# Patient Record
Sex: MALE | Race: WHITE | Employment: OTHER | URBAN - METROPOLITAN AREA
[De-identification: names, ages, dates, MRNs, and addresses within clinical notes are randomized per-mention and may not be internally consistent; named-entity substitution may affect disease eponyms.]

---

## 2018-11-09 ENCOUNTER — APPOINTMENT (OUTPATIENT)
Dept: GENERAL RADIOLOGY | Age: 58
End: 2018-11-09
Attending: EMERGENCY MEDICINE
Payer: SELF-PAY

## 2018-11-09 ENCOUNTER — HOSPITAL ENCOUNTER (OUTPATIENT)
Age: 58
Setting detail: OBSERVATION
Discharge: HOME | End: 2018-11-12
Attending: EMERGENCY MEDICINE | Admitting: INTERNAL MEDICINE
Payer: SELF-PAY

## 2018-11-09 DIAGNOSIS — R07.9 CHEST PAIN: ICD-10-CM

## 2018-11-09 DIAGNOSIS — R07.9 CHEST PAIN, UNSPECIFIED TYPE: Primary | ICD-10-CM

## 2018-11-09 LAB
ALBUMIN SERPL-MCNC: 3.8 G/DL (ref 3.5–5)
ALBUMIN/GLOB SERPL: 1.1 {RATIO} (ref 1.2–3.5)
ALP SERPL-CCNC: 123 U/L (ref 50–136)
ALT SERPL-CCNC: 27 U/L (ref 12–65)
ANION GAP SERPL CALC-SCNC: 9 MMOL/L (ref 7–16)
AST SERPL-CCNC: 15 U/L (ref 15–37)
BASOPHILS # BLD: 0 K/UL (ref 0–0.2)
BASOPHILS NFR BLD: 1 % (ref 0–2)
BILIRUB SERPL-MCNC: 0.2 MG/DL (ref 0.2–1.1)
BUN SERPL-MCNC: 15 MG/DL (ref 6–23)
CALCIUM SERPL-MCNC: 8.5 MG/DL (ref 8.3–10.4)
CHLORIDE SERPL-SCNC: 109 MMOL/L (ref 98–107)
CO2 SERPL-SCNC: 22 MMOL/L (ref 21–32)
CREAT SERPL-MCNC: 1.16 MG/DL (ref 0.8–1.5)
DIFFERENTIAL METHOD BLD: ABNORMAL
EOSINOPHIL # BLD: 0.1 K/UL (ref 0–0.8)
EOSINOPHIL NFR BLD: 3 % (ref 0.5–7.8)
ERYTHROCYTE [DISTWIDTH] IN BLOOD BY AUTOMATED COUNT: 19.3 %
GLOBULIN SER CALC-MCNC: 3.5 G/DL (ref 2.3–3.5)
GLUCOSE SERPL-MCNC: 82 MG/DL (ref 65–100)
HCT VFR BLD AUTO: 34.3 % (ref 41.1–50.3)
HGB BLD-MCNC: 10.3 G/DL (ref 13.6–17.2)
IMM GRANULOCYTES # BLD: 0.1 K/UL (ref 0–0.5)
IMM GRANULOCYTES NFR BLD AUTO: 2 % (ref 0–5)
LYMPHOCYTES # BLD: 0.9 K/UL (ref 0.5–4.6)
LYMPHOCYTES NFR BLD: 26 % (ref 13–44)
MCH RBC QN AUTO: 24.6 PG (ref 26.1–32.9)
MCHC RBC AUTO-ENTMCNC: 30 G/DL (ref 31.4–35)
MCV RBC AUTO: 82.1 FL (ref 79.6–97.8)
MONOCYTES # BLD: 0.5 K/UL (ref 0.1–1.3)
MONOCYTES NFR BLD: 14 % (ref 4–12)
NEUTS SEG # BLD: 1.9 K/UL (ref 1.7–8.2)
NEUTS SEG NFR BLD: 54 % (ref 43–78)
NRBC # BLD: 0.02 K/UL (ref 0–0.2)
PLATELET # BLD AUTO: 230 K/UL (ref 150–450)
PMV BLD AUTO: 10.6 FL (ref 9.4–12.3)
POTASSIUM SERPL-SCNC: 4.2 MMOL/L (ref 3.5–5.1)
PROT SERPL-MCNC: 7.3 G/DL (ref 6.3–8.2)
RBC # BLD AUTO: 4.18 M/UL (ref 4.23–5.6)
SODIUM SERPL-SCNC: 140 MMOL/L (ref 136–145)
TROPONIN I BLD-MCNC: 0.01 NG/ML (ref 0.02–0.05)
WBC # BLD AUTO: 3.4 K/UL (ref 4.3–11.1)

## 2018-11-09 PROCEDURE — 93005 ELECTROCARDIOGRAM TRACING: CPT | Performed by: EMERGENCY MEDICINE

## 2018-11-09 PROCEDURE — 80053 COMPREHEN METABOLIC PANEL: CPT

## 2018-11-09 PROCEDURE — 84484 ASSAY OF TROPONIN QUANT: CPT

## 2018-11-09 PROCEDURE — 99285 EMERGENCY DEPT VISIT HI MDM: CPT | Performed by: EMERGENCY MEDICINE

## 2018-11-09 PROCEDURE — 71046 X-RAY EXAM CHEST 2 VIEWS: CPT

## 2018-11-09 PROCEDURE — 85025 COMPLETE CBC W/AUTO DIFF WBC: CPT

## 2018-11-09 RX ORDER — CLOPIDOGREL BISULFATE 75 MG/1
75 TABLET ORAL
COMMUNITY

## 2018-11-09 RX ORDER — LAMOTRIGINE 100 MG/1
100 TABLET ORAL 2 TIMES DAILY
COMMUNITY

## 2018-11-09 RX ORDER — LEVETIRACETAM 1000 MG/1
1000 TABLET ORAL 2 TIMES DAILY
COMMUNITY

## 2018-11-09 RX ORDER — ONDANSETRON 2 MG/ML
4 INJECTION INTRAMUSCULAR; INTRAVENOUS
Status: DISCONTINUED | OUTPATIENT
Start: 2018-11-09 | End: 2018-11-10

## 2018-11-09 RX ORDER — ENOXAPARIN SODIUM 100 MG/ML
80 INJECTION SUBCUTANEOUS
COMMUNITY

## 2018-11-09 RX ORDER — LOSARTAN POTASSIUM 100 MG/1
100 TABLET ORAL DAILY
COMMUNITY

## 2018-11-09 RX ORDER — WARFARIN 10 MG/1
10 TABLET ORAL DAILY
COMMUNITY

## 2018-11-09 RX ORDER — METOPROLOL SUCCINATE 25 MG/1
25 TABLET, EXTENDED RELEASE ORAL DAILY
COMMUNITY

## 2018-11-09 RX ORDER — TEMAZEPAM 30 MG/1
CAPSULE ORAL
COMMUNITY

## 2018-11-09 RX ORDER — SERTRALINE HYDROCHLORIDE 100 MG/1
100 TABLET, FILM COATED ORAL DAILY
COMMUNITY

## 2018-11-09 RX ORDER — ATORVASTATIN CALCIUM 20 MG/1
20 TABLET, FILM COATED ORAL DAILY
COMMUNITY

## 2018-11-10 ENCOUNTER — APPOINTMENT (OUTPATIENT)
Dept: ULTRASOUND IMAGING | Age: 58
End: 2018-11-10
Attending: NURSE PRACTITIONER
Payer: SELF-PAY

## 2018-11-10 PROBLEM — R56.9 SEIZURE (HCC): Chronic | Status: ACTIVE | Noted: 2018-11-10

## 2018-11-10 PROBLEM — S06.9XAA TBI (TRAUMATIC BRAIN INJURY): Chronic | Status: ACTIVE | Noted: 2018-11-10

## 2018-11-10 PROBLEM — Z86.73 H/O: CVA (CEREBROVASCULAR ACCIDENT): Chronic | Status: ACTIVE | Noted: 2018-11-10

## 2018-11-10 PROBLEM — Z86.711 HX PULMONARY EMBOLISM: Chronic | Status: ACTIVE | Noted: 2018-11-10

## 2018-11-10 PROBLEM — I25.10 CAD (CORONARY ARTERY DISEASE): Status: ACTIVE | Noted: 2018-11-10

## 2018-11-10 PROBLEM — I10 HTN (HYPERTENSION): Chronic | Status: ACTIVE | Noted: 2018-11-10

## 2018-11-10 PROBLEM — R07.9 CHEST PAIN: Status: ACTIVE | Noted: 2018-11-10

## 2018-11-10 PROBLEM — Z86.718 HISTORY OF DVT (DEEP VEIN THROMBOSIS): Chronic | Status: ACTIVE | Noted: 2018-11-10

## 2018-11-10 PROBLEM — I50.22 CHRONIC SYSTOLIC CHF (CONGESTIVE HEART FAILURE) (HCC): Chronic | Status: ACTIVE | Noted: 2018-11-10

## 2018-11-10 PROBLEM — I25.10 CAD (CORONARY ARTERY DISEASE): Chronic | Status: ACTIVE | Noted: 2018-11-10

## 2018-11-10 PROBLEM — E78.5 HLD (HYPERLIPIDEMIA): Chronic | Status: ACTIVE | Noted: 2018-11-10

## 2018-11-10 PROBLEM — F43.10 PTSD (POST-TRAUMATIC STRESS DISORDER): Chronic | Status: ACTIVE | Noted: 2018-11-10

## 2018-11-10 LAB
AMPHET UR QL SCN: NEGATIVE
ATRIAL RATE: 68 BPM
ATRIAL RATE: 97 BPM
BARBITURATES UR QL SCN: NEGATIVE
BENZODIAZ UR QL: NEGATIVE
CALCULATED P AXIS, ECG09: 18 DEGREES
CALCULATED P AXIS, ECG09: 32 DEGREES
CALCULATED R AXIS, ECG10: 24 DEGREES
CALCULATED R AXIS, ECG10: 24 DEGREES
CALCULATED T AXIS, ECG11: 25 DEGREES
CALCULATED T AXIS, ECG11: 4 DEGREES
CANNABINOIDS UR QL SCN: NEGATIVE
CHOLEST SERPL-MCNC: 127 MG/DL
COCAINE UR QL SCN: NEGATIVE
D DIMER PPP FEU-MCNC: 1.7 UG/ML(FEU)
DIAGNOSIS, 93000: NORMAL
DIAGNOSIS, 93000: NORMAL
HDLC SERPL-MCNC: 41 MG/DL (ref 40–60)
HDLC SERPL: 3.1 {RATIO}
INR PPP: 1
LDLC SERPL CALC-MCNC: 33.4 MG/DL
LIPID PROFILE,FLP: ABNORMAL
METHADONE UR QL: NEGATIVE
OPIATES UR QL: POSITIVE
P-R INTERVAL, ECG05: 144 MS
P-R INTERVAL, ECG05: 146 MS
PCP UR QL: NEGATIVE
PROTHROMBIN TIME: 13.1 SEC (ref 11.5–14.5)
Q-T INTERVAL, ECG07: 346 MS
Q-T INTERVAL, ECG07: 416 MS
QRS DURATION, ECG06: 80 MS
QRS DURATION, ECG06: 86 MS
QTC CALCULATION (BEZET), ECG08: 439 MS
QTC CALCULATION (BEZET), ECG08: 442 MS
TRIGL SERPL-MCNC: 263 MG/DL (ref 35–150)
TROPONIN I SERPL-MCNC: <0.02 NG/ML (ref 0.02–0.05)
VENTRICULAR RATE, ECG03: 68 BPM
VENTRICULAR RATE, ECG03: 97 BPM
VLDLC SERPL CALC-MCNC: 52.6 MG/DL (ref 6–23)

## 2018-11-10 PROCEDURE — 93005 ELECTROCARDIOGRAM TRACING: CPT | Performed by: NURSE PRACTITIONER

## 2018-11-10 PROCEDURE — 36592 COLLECT BLOOD FROM PICC: CPT

## 2018-11-10 PROCEDURE — 96375 TX/PRO/DX INJ NEW DRUG ADDON: CPT

## 2018-11-10 PROCEDURE — 80061 LIPID PANEL: CPT

## 2018-11-10 PROCEDURE — 99218 HC RM OBSERVATION: CPT

## 2018-11-10 PROCEDURE — 85379 FIBRIN DEGRADATION QUANT: CPT

## 2018-11-10 PROCEDURE — 74011250636 HC RX REV CODE- 250/636: Performed by: NURSE PRACTITIONER

## 2018-11-10 PROCEDURE — 74011000258 HC RX REV CODE- 258: Performed by: NURSE PRACTITIONER

## 2018-11-10 PROCEDURE — 96366 THER/PROPH/DIAG IV INF ADDON: CPT

## 2018-11-10 PROCEDURE — 96376 TX/PRO/DX INJ SAME DRUG ADON: CPT

## 2018-11-10 PROCEDURE — 85610 PROTHROMBIN TIME: CPT

## 2018-11-10 PROCEDURE — 74011250637 HC RX REV CODE- 250/637: Performed by: NURSE PRACTITIONER

## 2018-11-10 PROCEDURE — 80307 DRUG TEST PRSMV CHEM ANLYZR: CPT

## 2018-11-10 PROCEDURE — 84484 ASSAY OF TROPONIN QUANT: CPT

## 2018-11-10 PROCEDURE — 96365 THER/PROPH/DIAG IV INF INIT: CPT

## 2018-11-10 RX ORDER — HEPARIN SODIUM 5000 [USP'U]/ML
4000 INJECTION, SOLUTION INTRAVENOUS; SUBCUTANEOUS ONCE
Status: DISPENSED | OUTPATIENT
Start: 2018-11-10 | End: 2018-11-10

## 2018-11-10 RX ORDER — METOPROLOL SUCCINATE 25 MG/1
25 TABLET, EXTENDED RELEASE ORAL DAILY
Status: DISCONTINUED | OUTPATIENT
Start: 2018-11-10 | End: 2018-11-12 | Stop reason: HOSPADM

## 2018-11-10 RX ORDER — TEMAZEPAM 15 MG/1
30 CAPSULE ORAL
Status: DISCONTINUED | OUTPATIENT
Start: 2018-11-10 | End: 2018-11-12 | Stop reason: HOSPADM

## 2018-11-10 RX ORDER — HEPARIN SODIUM 5000 [USP'U]/100ML
12-25 INJECTION, SOLUTION INTRAVENOUS
Status: DISCONTINUED | OUTPATIENT
Start: 2018-11-10 | End: 2018-11-10

## 2018-11-10 RX ORDER — AMPICILLIN SODIUM AND SULBACTAM SODIUM 1; .5 G/1; G/1
1.5 INJECTION, POWDER, FOR SOLUTION INTRAVENOUS EVERY 6 HOURS
COMMUNITY

## 2018-11-10 RX ORDER — ATORVASTATIN CALCIUM 10 MG/1
20 TABLET, FILM COATED ORAL DAILY
Status: DISCONTINUED | OUTPATIENT
Start: 2018-11-11 | End: 2018-11-12 | Stop reason: HOSPADM

## 2018-11-10 RX ORDER — ENOXAPARIN SODIUM 100 MG/ML
80 INJECTION SUBCUTANEOUS EVERY 12 HOURS
Status: DISCONTINUED | OUTPATIENT
Start: 2018-11-10 | End: 2018-11-12 | Stop reason: HOSPADM

## 2018-11-10 RX ORDER — LAMOTRIGINE 100 MG/1
100 TABLET ORAL 2 TIMES DAILY
Status: DISCONTINUED | OUTPATIENT
Start: 2018-11-10 | End: 2018-11-12 | Stop reason: HOSPADM

## 2018-11-10 RX ORDER — MORPHINE SULFATE 2 MG/ML
2 INJECTION, SOLUTION INTRAMUSCULAR; INTRAVENOUS
Status: DISCONTINUED | OUTPATIENT
Start: 2018-11-10 | End: 2018-11-12 | Stop reason: HOSPADM

## 2018-11-10 RX ORDER — LEVETIRACETAM 500 MG/1
1000 TABLET ORAL 2 TIMES DAILY
Status: DISCONTINUED | OUTPATIENT
Start: 2018-11-10 | End: 2018-11-12 | Stop reason: HOSPADM

## 2018-11-10 RX ORDER — SODIUM CHLORIDE 0.9 % (FLUSH) 0.9 %
5-10 SYRINGE (ML) INJECTION AS NEEDED
Status: DISCONTINUED | OUTPATIENT
Start: 2018-11-10 | End: 2018-11-12 | Stop reason: HOSPADM

## 2018-11-10 RX ORDER — CLOPIDOGREL BISULFATE 75 MG/1
75 TABLET ORAL DAILY
Status: DISCONTINUED | OUTPATIENT
Start: 2018-11-10 | End: 2018-11-12 | Stop reason: HOSPADM

## 2018-11-10 RX ORDER — ONDANSETRON 2 MG/ML
4 INJECTION INTRAMUSCULAR; INTRAVENOUS
Status: DISCONTINUED | OUTPATIENT
Start: 2018-11-10 | End: 2018-11-12 | Stop reason: HOSPADM

## 2018-11-10 RX ORDER — SODIUM CHLORIDE 0.9 % (FLUSH) 0.9 %
5-10 SYRINGE (ML) INJECTION EVERY 8 HOURS
Status: DISCONTINUED | OUTPATIENT
Start: 2018-11-10 | End: 2018-11-12 | Stop reason: HOSPADM

## 2018-11-10 RX ORDER — LOSARTAN POTASSIUM 50 MG/1
100 TABLET ORAL DAILY
Status: DISCONTINUED | OUTPATIENT
Start: 2018-11-10 | End: 2018-11-12 | Stop reason: HOSPADM

## 2018-11-10 RX ORDER — SERTRALINE HYDROCHLORIDE 100 MG/1
100 TABLET, FILM COATED ORAL DAILY
Status: DISCONTINUED | OUTPATIENT
Start: 2018-11-10 | End: 2018-11-12 | Stop reason: HOSPADM

## 2018-11-10 RX ORDER — ACETAMINOPHEN 325 MG/1
650 TABLET ORAL
Status: DISCONTINUED | OUTPATIENT
Start: 2018-11-10 | End: 2018-11-12 | Stop reason: HOSPADM

## 2018-11-10 RX ORDER — DIPHENHYDRAMINE HYDROCHLORIDE 50 MG/ML
50 INJECTION, SOLUTION INTRAMUSCULAR; INTRAVENOUS EVERY 6 HOURS
Status: DISCONTINUED | OUTPATIENT
Start: 2018-11-10 | End: 2018-11-12 | Stop reason: HOSPADM

## 2018-11-10 RX ORDER — NALOXONE HYDROCHLORIDE 0.4 MG/ML
0.4 INJECTION, SOLUTION INTRAMUSCULAR; INTRAVENOUS; SUBCUTANEOUS AS NEEDED
Status: DISCONTINUED | OUTPATIENT
Start: 2018-11-10 | End: 2018-11-12 | Stop reason: HOSPADM

## 2018-11-10 RX ORDER — ATORVASTATIN CALCIUM 10 MG/1
20 TABLET, FILM COATED ORAL
Status: DISCONTINUED | OUTPATIENT
Start: 2018-11-10 | End: 2018-11-10

## 2018-11-10 RX ORDER — NITROGLYCERIN 0.4 MG/1
0.4 TABLET SUBLINGUAL
Status: DISCONTINUED | OUTPATIENT
Start: 2018-11-10 | End: 2018-11-12 | Stop reason: HOSPADM

## 2018-11-10 RX ADMIN — SODIUM CHLORIDE 1.5 G: 900 INJECTION, SOLUTION INTRAVENOUS at 15:02

## 2018-11-10 RX ADMIN — Medication 10 ML: at 22:22

## 2018-11-10 RX ADMIN — DIPHENHYDRAMINE HYDROCHLORIDE 50 MG: 50 INJECTION, SOLUTION INTRAMUSCULAR; INTRAVENOUS at 09:13

## 2018-11-10 RX ADMIN — SERTRALINE HYDROCHLORIDE 100 MG: 100 TABLET ORAL at 10:54

## 2018-11-10 RX ADMIN — TEMAZEPAM 30 MG: 15 CAPSULE ORAL at 02:14

## 2018-11-10 RX ADMIN — TEMAZEPAM 30 MG: 15 CAPSULE ORAL at 22:20

## 2018-11-10 RX ADMIN — LEVETIRACETAM 1000 MG: 500 TABLET ORAL at 10:54

## 2018-11-10 RX ADMIN — SODIUM CHLORIDE 1.5 G: 900 INJECTION, SOLUTION INTRAVENOUS at 09:12

## 2018-11-10 RX ADMIN — Medication 10 ML: at 15:03

## 2018-11-10 RX ADMIN — LAMOTRIGINE 100 MG: 100 TABLET ORAL at 22:20

## 2018-11-10 RX ADMIN — ACETAMINOPHEN 650 MG: 325 TABLET, FILM COATED ORAL at 03:00

## 2018-11-10 RX ADMIN — DIPHENHYDRAMINE HYDROCHLORIDE 50 MG: 50 INJECTION, SOLUTION INTRAMUSCULAR; INTRAVENOUS at 15:03

## 2018-11-10 RX ADMIN — SODIUM CHLORIDE 1.5 G: 900 INJECTION, SOLUTION INTRAVENOUS at 20:45

## 2018-11-10 RX ADMIN — DIPHENHYDRAMINE HYDROCHLORIDE 50 MG: 50 INJECTION, SOLUTION INTRAMUSCULAR; INTRAVENOUS at 20:45

## 2018-11-10 RX ADMIN — Medication 10 ML: at 02:09

## 2018-11-10 RX ADMIN — LEVETIRACETAM 1000 MG: 500 TABLET ORAL at 22:20

## 2018-11-10 RX ADMIN — SODIUM CHLORIDE 1.5 G: 900 INJECTION, SOLUTION INTRAVENOUS at 02:11

## 2018-11-10 RX ADMIN — DIPHENHYDRAMINE HYDROCHLORIDE 50 MG: 50 INJECTION, SOLUTION INTRAMUSCULAR; INTRAVENOUS at 02:08

## 2018-11-10 RX ADMIN — LAMOTRIGINE 100 MG: 100 TABLET ORAL at 10:54

## 2018-11-10 RX ADMIN — CLOPIDOGREL BISULFATE 75 MG: 75 TABLET ORAL at 10:54

## 2018-11-10 NOTE — ED NOTES
TRANSFER - OUT REPORT: 
 
Verbal report given to Ary RN(name) on Suzanne Guevara  being transferred to 330(unit) for routine progression of care Report consisted of patients Situation, Background, Assessment and  
Recommendations(SBAR). Information from the following report(s) ED Summary was reviewed with the receiving nurse. Lines:    
 
Opportunity for questions and clarification was provided. Patient transported with: 
 Registered Nurse

## 2018-11-10 NOTE — ED PROVIDER NOTES
59-year-old male with a history of pulmonary embolism, multiple DVTs, seizures, stroke,  Coronary artery disease with MI, 4 stents, triple bypass surgery, aortic valve replacement, congestive heart failure, hypertension, AAA, high cholesterol presents with chest pain that started around 3:00. He took 4 nitroglycerin with some improvement, but still having pain. He states pain radiates to the left arm and jaw and back. He also reports shortness of breath. He has had a mild cough. Denies fever. He reports 4 episodes of vomiting, which is happened in the past with heart attacks. He recently moved from [place] where he had all prior procedures. 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 He also reports history of aortic aneurysm repair and a right-sided iliac aneurysm repair. states he was taken off Coumadin about a month ago and placed on  Lovenox. He is not sure why he had to make this change. Patient also states he is being treated with Unasyn via a PICC line for left jaw osteomyelitis. María Moran The history is provided by the patient. Chest Pain (Angina) Associated symptoms include cough, nausea, shortness of breath and vomiting. Pertinent negatives include no abdominal pain, no fever and no headaches. Past Medical History:  
Diagnosis Date  CAD (coronary artery disease) AMI x 4, heart block, afib, mitral insufficiency  Gastrointestinal disorder  Heart failure ([address]  Hypertension  Ill-defined condition  Psychiatric disorder PTSD  Seizures ([address]  Sleep disorder Insomnia  Stroke ([address] Right-sided deficits  Thromboembolus ([address] Past Surgical History:  
Procedure Laterality Date  ABDOMEN SURGERY PROC UNLISTED    
 surgery for [facility]  CARDIAC SURG PROCEDURE UNLIST Abdominal aortic repair, stents x 5, triple bypass, aortic valve repair No family history on file. Social History Socioeconomic History  Marital status:  Spouse name: Not on file  Number of children: Not on file  Years of education: Not on file  Highest education level: Not on file Social Needs  Financial resource strain: Not on file  Food insecurity - worry: Not on file  Food insecurity - inability: Not on file  Transportation needs - medical: Not on file  Transportation needs - non-medical: Not on file Occupational History  Not on file Tobacco Use  Smoking status: Light Tobacco Smoker  Smokeless tobacco: Never Used Substance and Sexual Activity  Alcohol use: Not on file  Drug use: No  
 Sexual activity: Not on file Other Topics Concern  Not on file Social History Narrative  Not on file ALLERGIES: Aspirin; Contrast agent [iodine]; and Penicillins Review of Systems Constitutional: Negative for fever. HENT: Negative for congestion. Eyes: Negative for visual disturbance. Respiratory: Positive for cough and shortness of breath. Cardiovascular: Positive for chest pain. Gastrointestinal: Positive for nausea and vomiting. Negative for abdominal pain and diarrhea. Genitourinary: Negative for dysuria. Musculoskeletal: Positive for arthralgias and myalgias. Skin: Negative for rash. Neurological: Negative for headaches. Psychiatric/Behavioral: Negative for confusion. Vitals:  
 11/09/18 1958 11/09/18 2120 BP: 118/67 Pulse: 100 79 Resp: 18 Temp: 98 °F (36.7 °C) SpO2: 99% 95% Weight: 79.4 kg (175 lb) Height: 5' 8\" (1.727 m) Physical Exam  
Constitutional: He appears well-developed and well-nourished. HENT:  
Head: Normocephalic and atraumatic. Right Ear: External ear normal.  
Left Ear: External ear normal.  
Nose: Nose normal.  
Mouth/Throat: Oropharynx is clear and moist.  
Eyes: Conjunctivae are normal. Pupils are equal, round, and reactive to light. Neck: Normal range of motion. Neck supple. Cardiovascular: Regular rhythm, normal heart sounds and intact distal pulses. No chest wall tenderness Pulmonary/Chest: Effort normal and breath sounds normal. No respiratory distress. He has no wheezes. Abdominal: Soft. Bowel sounds are normal. He exhibits no distension. There is no tenderness. Musculoskeletal: Normal range of motion. He exhibits no edema. Neurological: He is alert. Skin: Skin is warm and dry. Psychiatric: Judgment normal.  
Nursing note and vitals reviewed. MDM Number of Diagnoses or Management Options Chest pain, unspecified type:  
Diagnosis management comments: Parts of this document were created using dragon voice recognition software. The chart has been reviewed but errors may still be present. 11:21 PM 
dw cardiology for evaluation. Has allergy to ASA. Amount and/or Complexity of Data Reviewed Clinical lab tests: ordered and reviewed (Results for orders placed or performed during the hospital encounter of 11/09/18 
-CBC WITH AUTOMATED DIFF Result                      Value             Ref Range WBC                         3.4 (L)           4.3 - 11.1 K* 
     RBC                         4.18 (L)          4.23 - 5.6 M* HGB                         10.3 (L)          13.6 - 17.2 * HCT                         34.3 (L)          41.1 - 50.3 % MCV                         82.1              79.6 - 97.8 * MCH                         24.6 (L)          26.1 - 32.9 * MCHC                        30.0 (L)          31.4 - 35.0 * RDW                         19.3              % PLATELET                    230               150 - 450 K/* MPV                         10.6              9.4 - 12.3 FL ABSOLUTE NRBC               0.02              0.0 - 0.2 K/* NEUTROPHILS                 54                43 - 78 % LYMPHOCYTES                 26                13 - 44 % MONOCYTES                   14 (H)            4.0 - 12.0 % EOSINOPHILS                 3                 0.5 - 7.8 % BASOPHILS                   1                 0.0 - 2.0 % IMMATURE GRANULOCYTES       2                 0.0 - 5.0 %   
     ABS. NEUTROPHILS            1.9               1.7 - 8.2 K/* ABS. LYMPHOCYTES            0.9               0.5 - 4.6 K/* ABS. MONOCYTES              0.5               0.1 - 1.3 K/* ABS. EOSINOPHILS            0.1               0.0 - 0.8 K/* ABS. BASOPHILS              0.0               0.0 - 0.2 K/* ABS. IMM. GRANS.            0.1               0.0 - 0.5 K/* DF                          AUTOMATED                       
-METABOLIC PANEL, COMPREHENSIVE Result                      Value             Ref Range Sodium                      140               136 - 145 mm* Potassium                   4.2               3.5 - 5.1 mm* Chloride                    109 (H)           98 - 107 mmo* CO2                         22                21 - 32 mmol* Anion gap                   9                 7 - 16 mmol/L Glucose                     82                65 - 100 mg/* BUN                         15                6 - 23 MG/DL Creatinine                  1.16              0.8 - 1.5 MG* 
     GFR est AA                  >60               >60 ml/min/1* GFR est non-AA              >60               >60 ml/min/1* Calcium                     8.5               8.3 - 10.4 M* Bilirubin, total            0.2               0.2 - 1.1 MG* ALT (SGPT)                  27                12 - 65 U/L   
     AST (SGOT)                  15                15 - 37 U/L Alk. phosphatase            123               50 - 136 U/L      Protein, total              7.3               6.3 - 8.2 g/* 
 Albumin                     3.8               3.5 - 5.0 g/* Globulin                    3.5               2.3 - 3.5 g/* A-G Ratio                   1.1 (L)           1.2 - 3.5     
-POC TROPONIN-I Result                      Value             Ref Range Troponin-I (POC)            0.01 (L)          0.02 - 0.05 * 
-EKG, 12 LEAD, INITIAL Result                      Value             Ref Range Ventricular Rate            97                BPM           
     Atrial Rate                 97                BPM           
     P-R Interval                144               ms            
     QRS Duration                80                ms Q-T Interval                346               ms            
     QTC Calculation (Bezet)     439               ms            
     Calculated P Axis           32                degrees Calculated R Axis           24                degrees Calculated T Axis           25                degrees Diagnosis Normal sinus rhythm Possible Left atrial enlargement Left ventricular hypertrophy Abnormal ECG No previous ECGs available ) Tests in the radiology section of CPT®: ordered and reviewed (Xr Chest Pa Lat Result Date: 11/9/2018 TWO-VIEW CHEST: CLINICAL HISTORY: CHEST pain beginning at 1530 hours today. COMPARISON:  None. FINDINGS: PA and lateral chest images demonstrate no acute pneumonic infiltrate or significant pleural fluid collection. Left brachial PICC line is in place with the tip near the cavoatrial junction. The heart size is within normal limits status post CABG without evidence of congestive heart failure or pneumothorax. The bony thorax appears intact on these views. Tip of an IVC filter is seen at the L2 level on the lateral image.   
 
IMPRESSION:  STATUS POST CABG WITH LEFT BRACHIAL PICC LINE IN PLACE BUT NO ACUTE CARDIOPULMONARY DISEASE IDENTIFIED. ) Tests in the medicine section of CPT®: ordered and reviewed Procedures

## 2018-11-10 NOTE — ED NOTES
Pt continues to refuse lab/IV stick. Charge nurse has spoken with pt. Pt requests house supervisor at this time.

## 2018-11-10 NOTE — CONSULTS
Infectious Disease Consult    Today's Date: 11/10/2018   Admit Date: 11/9/2018    Impression:   · Jaw osteo dx in [place]; s/p 4 weeks IV Unasyn: Patient followed by [facility] ED system and has appt (per patient) at [place] next week with ID Doc. Has enough Unasyn to complete course of therapy; would defer any management to [address] [place] [address]:   ·  Would defer any further management to [facility] ED as patient has f/u appt in [facility] with ID doc next week      Anti-infectives:   · IV Unasyn    Subjective:   Date of Consultation:  November 10, 2018  Referring Physician: Dr. Nelli Castro    Patient is a 62 y.o. male with a history of HTN, CHF, CAD s/p CABG and with numerous stents, seizure d/o admitted to the cardiology service on 11/9 with recurrent, intermittent chest pain. Trops and EKG unremarkable. Stress test is pending. He just moved here from [place] about 2 weeks ago to be closer to his children and grand-children. About 4 weeks ago he underwent a left lower molar tooth extraction during which the tooth was not fully removed and actually was broken. Apparently he then developed osteomyelitis of the jaw. He says he was seen by ID at [facility] and PICC line was placed and he was scheduled to be on 1.5g Unasyn q6 hours for 6 weeks. He does not have any medical records with him. After he moved he says the remaining two weeks of his antibiotics were mailed to him and he has been compliant. He does not have any PCP or other outpatient physicians established since moving. Apparently the patient has a penicillin allergy but takes Benadryl prior to each dose. ROS otherwise negative.         .     Patient Active Problem List   Diagnosis Code    Chest pain R07.9    Chronic systolic CHF (congestive heart failure) (HCC) I50.22    HTN (hypertension) I10    HLD (hyperlipidemia) E78.5    CAD (coronary artery disease) I25.10    Seizure ([facility] R56.9    TBI (traumatic brain injury) ([facility] S06. 9X5A    PTSD (post-traumatic stress disorder) F43.10    Hx pulmonary embolism Z86.711    History of DVT (deep vein thrombosis) Z86.718    H/O: CVA (cerebrovascular accident) Z80.78     Past Medical History:   Diagnosis Date    CAD (coronary artery disease)     AMI x 4, heart block, afib, mitral insufficiency    Gastrointestinal disorder     Heart failure ([facility]     Hypertension     Ill-defined condition     Psychiatric disorder     PTSD    Seizures ([facility]     Sleep disorder     Insomnia    Stroke ([facility]     Right-sided deficits    Thromboembolus ([facility]       No family history on file. Social History     Tobacco Use    Smoking status: Light Tobacco Smoker    Smokeless tobacco: Never Used   Substance Use Topics    Alcohol use: Not on file     Past Surgical History:   Procedure Laterality Date    ABDOMEN SURGERY PROC UNLISTED      surgery for [facility]    CARDIAC SURG PROCEDURE UNLIST      Abdominal aortic repair, stents x 5, triple bypass, aortic valve repair      Prior to Admission medications    Medication Sig Start Date End Date Taking? Authorizing Provider   ampicillin-sulbactam 1.5 gram solr 1.5 g by IntraVENous route every six (6) hours. Through PICC line   Yes Provider, Chioma   levETIRAcetam (KEPPRA) 1,000 mg tablet Take 1,000 mg by mouth two (2) times a day. Yes Evelio, MD Natalya   lamoTRIgine (LAMICTAL) 100 mg tablet Take 100 mg by mouth two (2) times a day. Yes Evelio, MD Natalya   atorvastatin (LIPITOR) 20 mg tablet Take 20 mg by mouth daily. Yes Natalya Fraser MD   losartan (COZAAR) 100 mg tablet Take 100 mg by mouth daily. Yes Evelio, MD Natalya   enoxaparin (LOVENOX) 80 mg/0.8 mL injection 80 mg by SubCUTAneous route. Yes Evelio, MD Natalya   clopidogrel (PLAVIX) 75 mg tab Take 75 mg by mouth. Yes Evelio, MD Natalya   sertraline (ZOLOFT) 100 mg tablet Take 100 mg by mouth daily. Yes Natalya Fraser MD   temazepam (RESTORIL) 30 mg capsule Take  by mouth nightly as needed for Sleep.    Yes Natalya Fraser MD   metoprolol succinate (TOPROL XL) 25 mg XL tablet Take 25 mg by mouth daily. Yes Other, MD Natalya   warfarin (COUMADIN) 10 mg tablet Take 10 mg by mouth daily. Yes Other, MD Natalya       Allergies   Allergen Reactions    Aspirin Anaphylaxis    Contrast Agent [Iodine] Hives    Penicillins Hives        Review of Systems:  A comprehensive review of systems was negative except for that written in the History of Present Illness. Objective:     Visit Vitals  /62   Pulse 73   Temp 97.8 °F (36.6 °C)   Resp 18   Ht 5' 8\" (1.727 m)   Wt 83.2 kg (183 lb 6.4 oz)   SpO2 96%   BMI 27.89 kg/m²     Temp (24hrs), Av.8 °F (36.6 °C), Min:97.5 °F (36.4 °C), Max:98.1 °F (36.7 °C)       Lines:  PICC:       Physical Exam:    General:  Alert, cooperative, well noursished, well developed, appears stated age   Eyes:  Sclera anicteric. Pupils equally round and reactive to light. Mouth/Throat: Mucous membranes normal, oral pharynx clear   Neck: Supple   Lungs:   Clear to auscultation bilaterally, good effort   CV:  Regular rate and rhythm,no murmur, click, rub or gallop   Abdomen:   Soft, non-tender.  bowel sounds normal. non-distended   Extremities: No cyanosis or edema   Skin: Skin color, texture, turgor normal. no acute rash or lesions   Lymph nodes: Cervical and supraclavicular normal   Musculoskeletal: No swelling or deformity   Lines/Devices:  Intact, no erythema, drainage or tenderness   Psych: Alert and oriented, normal mood affect given the setting       Data Review:     CBC:  Recent Labs     18   WBC 3.4*   GRANS 54   MONOS 14*   EOS 3   ANEU 1.9   ABL 0.9   HGB 10.3*   HCT 34.3*          BMP:  Recent Labs     18   CREA 1.16   BUN 15      K 4.2   *   CO2 22   AGAP 9   GLU 82       LFTS:  Recent Labs     18   TBILI 0.2   ALT 27   SGOT 15      TP 7.3   ALB 3.8       Microbiology:     All Micro Results     None          Imaging:   CXR (-)    Signed By: Danuta Perez NP November 10, 2018

## 2018-11-10 NOTE — H&P
[facility] Cardiology History & Physical  
  
Date of  Admission: 11/9/2018  9:07 PM  
 
Primary Care Physician: [place] 
Primary Cardiologist: [place] Admitting Physician: Dr. Gianluca Nazario 
 
CC: Chest pain HPI:  Marcos Lundy is a 62 y.o.  male who was previously followed at the [facility] and recently relocated to [facility] (~2 weeks ago) and is trying to get established with [address]. He has reported PMHx of CAD [s/p MI x2 (2009, then 1 week later had repeat MI), PCI x5 (all 2009), CABG x3 after sudden cardiac arrest (2014), AV repair (2015)], sHF (EF 47% on ECHO ~1 month ago), TBI with seizures r/t combat (last seizure 13yrs ago), PTSD, AAA (s/p repair 1 month ago), R iliac aneurysm repair (1 month ago), HLD, h/o PEs and DVTs (last 2014, maintained on Coumadin with stable INRs) and HTN who presented to the ED with c/o CP. States that he developed sudden onset sharp L sided CP at approx 1500. He took a SL NTG with some relief. States that over the next few hours he continued to take an intermittent SL NTG when the pain would return. States took 4 NTG total. Reports associated nausea and vomited x4. States prior to this he had felt well all day. States he was concerned as had similar presentation with previous MIs and thus came to ED for evaluation. He states that he is taking all meds as prescribed. Allergy to ASA, taking Plavix and currently on BID WB Lovenox (has been on this for ruby-op reasons). Supposed to resume Coumadin this coming week. Has been off Coumadin for 1 month. Reports also dx with L jaw osteomyelitis after having a broken tooth during an extraction procedure. He has a L brachial PICC line in place and has been giving himself QID doses of Unasyn. Has 2 weeks left (6 week course). States has allergy to PCN but pre-medicates with 50mg Benadryl. In ED -- initial trop 0.01 Past Medical History:  
Diagnosis Date  CAD (coronary artery disease) AMI x 4, heart block, afib, mitral insufficiency  Gastrointestinal disorder  Heart failure ([facility] [address]  Hypertension  Ill-defined condition  Psychiatric disorder PTSD  [facility]  Sleep disorder Insomnia  Stroke ([facility] Right-sided deficits  Thromboembolus ([facility] Past Surgical History:  
Procedure Laterality Date  ABDOMEN SURGERY PROC UNLISTED    
 surgery for [facility]  CARDIAC SURG PROCEDURE UNLIST Abdominal aortic repair, stents x 5, triple bypass, aortic valve repair Allergies Allergen Reactions  Aspirin Anaphylaxis  Contrast Agent [Iodine] Hives  Penicillins Hives Social History Socioeconomic History  Marital status:  Spouse name: Not on file  Number of children: Not on file  Years of education: Not on file  Highest education level: Not on file Social Needs  Financial resource strain: Not on file  Food insecurity - worry: Not on file  Food insecurity - inability: Not on file  Transportation needs - medical: Not on file  Transportation needs - non-medical: Not on file Occupational History  Not on file Tobacco Use  Smoking status: Light Tobacco Smoker  Smokeless tobacco: Never Used Substance and Sexual Activity  Alcohol use: Not on file  Drug use: No  
 Sexual activity: Not on file Other Topics Concern  Not on file Social History Narrative  Not on file No family history on file. Current Facility-Administered Medications Medication Dose Route Frequency  ondansetron (ZOFRAN) injection 4 mg  4 mg IntraVENous NOW  nitroglycerin (NITROBID) 2 % ointment 1 Inch  1 Inch Topical NOW Current Outpatient Medications Medication Sig  levETIRAcetam (KEPPRA) 1,000 mg tablet Take 1,000 mg by mouth two (2) times a day.  lamoTRIgine (LAMICTAL) 100 mg tablet Take 100 mg by mouth two (2) times a day.  atorvastatin (LIPITOR) 20 mg tablet Take 20 mg by mouth daily.  losartan (COZAAR) 100 mg tablet Take 100 mg by mouth daily.  enoxaparin (LOVENOX) 80 mg/0.8 mL injection 80 mg by SubCUTAneous route.  clopidogrel (PLAVIX) 75 mg tab Take 75 mg by mouth.  sertraline (ZOLOFT) 100 mg tablet Take 100 mg by mouth daily.  temazepam (RESTORIL) 30 mg capsule Take  by mouth nightly as needed for Sleep.  metoprolol succinate (TOPROL XL) 25 mg XL tablet Take 25 mg by mouth daily.  warfarin (COUMADIN) 10 mg tablet Take 10 mg by mouth daily. Review of Systems Review of Systems Constitution: Negative for decreased appetite, diaphoresis, fever, weakness and malaise/fatigue. HENT: Negative. Eyes: Negative. Cardiovascular: Positive for chest pain. Negative for dyspnea on exertion, irregular heartbeat, leg swelling, near-syncope, orthopnea, palpitations, paroxysmal nocturnal dyspnea and syncope. Respiratory: Negative for cough, shortness of breath, sleep disturbances due to breathing, sputum production and wheezing. Endocrine: Negative. Hematologic/Lymphatic: Bruises/bleeds easily. Skin: Negative. Musculoskeletal: Negative. Gastrointestinal: Positive for nausea and vomiting. Negative for abdominal pain and change in bowel habit. Genitourinary: Negative. Neurological: Positive for headaches and seizures. Negative for dizziness and light-headedness. HA from NTG Psychiatric/Behavioral:  
     +PTSD, +TBI Subjective:  
 
Visit Vitals /78 Pulse 64 Temp 98 °F (36.7 °C) Resp 25 Ht 5' 8\" (1.727 m) Wt 79.4 kg (175 lb) SpO2 96% BMI 26.61 kg/m² Physical Exam  
Constitutional: He is oriented to person, place, and time and well-developed, well-nourished, and in no distress. HENT:  
Head: Normocephalic and atraumatic. Nose: Nose normal.  
Mouth/Throat: Oropharynx is clear and moist.  
Poor dentition with missing teeth Eyes: Conjunctivae and EOM are normal. Pupils are equal, round, and reactive to light. No scleral icterus. Neck: Normal range of motion. Neck supple. No JVD present. No tracheal deviation present. Cardiovascular: Normal rate, regular rhythm, normal heart sounds and intact distal pulses. Exam reveals no friction rub. No murmur heard. Pulmonary/Chest: No stridor. No respiratory distress. He has no wheezes. He has no rales. Abdominal: Soft. Bowel sounds are normal. He exhibits no distension. There is no tenderness. Musculoskeletal: Normal range of motion. He exhibits no edema. Neurological: He is alert and oriented to person, place, and time. No cranial nerve deficit. Skin: Skin is warm and dry. Psychiatric: Mood, memory, affect and judgment normal.  
 
 
Cardiographics Telemetry: SR 60s ECG: SR 97, no acute ST changes Labs:  
Recent Results (from the past 24 hour(s)) EKG, 12 LEAD, INITIAL Collection Time: 11/09/18  7:52 PM  
Result Value Ref Range Ventricular Rate 97 BPM  
 Atrial Rate 97 BPM  
 P-R Interval 144 ms QRS Duration 80 ms  
 Q-T Interval 346 ms  
 QTC Calculation (Bezet) 439 ms Calculated P Axis 32 degrees Calculated R Axis 24 degrees Calculated T Axis 25 degrees Diagnosis Normal sinus rhythm Possible Left atrial enlargement Left ventricular hypertrophy Abnormal ECG No previous ECGs available POC TROPONIN-I Collection Time: 11/09/18  8:06 PM  
Result Value Ref Range Troponin-I (POC) 0.01 (L) 0.02 - 0.05 ng/ml CBC WITH AUTOMATED DIFF Collection Time: 11/09/18  8:07 PM  
Result Value Ref Range WBC 3.4 (L) 4.3 - 11.1 K/uL  
 RBC 4.18 (L) 4.23 - 5.6 M/uL  
 HGB 10.3 (L) 13.6 - 17.2 g/dL HCT 34.3 (L) 41.1 - 50.3 % MCV 82.1 79.6 - 97.8 FL  
 MCH 24.6 (L) 26.1 - 32.9 PG  
 MCHC 30.0 (L) 31.4 - 35.0 g/dL  
 RDW 19.3 % PLATELET 806 308 - 797 K/uL MPV 10.6 9.4 - 12.3 FL ABSOLUTE NRBC 0.02 0.0 - 0.2 K/uL NEUTROPHILS 54 43 - 78 % LYMPHOCYTES 26 13 - 44 % MONOCYTES 14 (H) 4.0 - 12.0 % EOSINOPHILS 3 0.5 - 7.8 % BASOPHILS 1 0.0 - 2.0 % IMMATURE GRANULOCYTES 2 0.0 - 5.0 %  
 ABS. NEUTROPHILS 1.9 1.7 - 8.2 K/UL  
 ABS. LYMPHOCYTES 0.9 0.5 - 4.6 K/UL  
 ABS. MONOCYTES 0.5 0.1 - 1.3 K/UL  
 ABS. EOSINOPHILS 0.1 0.0 - 0.8 K/UL  
 ABS. BASOPHILS 0.0 0.0 - 0.2 K/UL  
 ABS. IMM. GRANS. 0.1 0.0 - 0.5 K/UL  
 DF AUTOMATED METABOLIC PANEL, COMPREHENSIVE Collection Time: 11/09/18  8:07 PM  
Result Value Ref Range Sodium 140 136 - 145 mmol/L Potassium 4.2 3.5 - 5.1 mmol/L Chloride 109 (H) 98 - 107 mmol/L  
 CO2 22 21 - 32 mmol/L Anion gap 9 7 - 16 mmol/L Glucose 82 65 - 100 mg/dL BUN 15 6 - 23 MG/DL Creatinine 1.16 0.8 - 1.5 MG/DL  
 GFR est AA >60 >60 ml/min/1.73m2 GFR est non-AA >60 >60 ml/min/1.73m2 Calcium 8.5 8.3 - 10.4 MG/DL Bilirubin, total 0.2 0.2 - 1.1 MG/DL  
 ALT (SGPT) 27 12 - 65 U/L  
 AST (SGOT) 15 15 - 37 U/L Alk. phosphatase 123 50 - 136 U/L Protein, total 7.3 6.3 - 8.2 g/dL Albumin 3.8 3.5 - 5.0 g/dL Globulin 3.5 2.3 - 3.5 g/dL A-G Ratio 1.1 (L) 1.2 - 3.5 Patient has been seen and examined by Dr. Genia Van and he agrees with the following assessment and plan: 
 
 Assessment/Plan: Active Problems: 
  Chest pain -- Pt has ASA allergy, refuses NTG paste or SL NTG at this time as not having CP and states that his BP \"drops\" with NTG, will admit for observation, cont home BB, ARB and statin, check serial Garth, NPO for poss LHC pending clinical course, likely need to repeat ECHO as [facility] Chronic systolic CHF (congestive heart failure) -- Pt reports last EF 47% on ECHO 1 month ago -- cont BB and ARB 
 
  HTN (hypertension) -- cont home meds HLD (hyperlipidemia) -- check lipids, cont statin CAD (coronary artery disease) -- no ASA due to allergy, cont Plavix, BB, ARB and statin Seizure -- cont home meds TBI (traumatic brain injury) -- s/p combat injury, followed at [place] PTSD (post-traumatic stress disorder) -- followed at [facility] pulmonary embolism -- prev on Coumadin, currently on Lovenox (WB dosing), will check INR History of DVT (deep vein thrombosis) -- prev on Coumadin, currently on Lovenox (WB dosing), will check INR 
  
  H/O: CVA (cerebrovascular accident) -- has some residual deficits Kristina Rubio NP 
11/10/2018 12:15 AM

## 2018-11-10 NOTE — PROGRESS NOTES
Patient's d dimer resulted critical at 1.7. Elida Flores NP notified. Orders received for duplex dopplers bilateral extremities later today.

## 2018-11-10 NOTE — ED TRIAGE NOTES
Pt states he started having chest pain around 330 pm today states he took 4 of his nitro pills but has not relieved it completely. States he cannot take aspirin due to being allergic. States he has been nauseated and vomiting. States he vomited about 5 minutes ago. States his pain is in the left side of his chest radiating to his back and down his left arm. States it is sharp. States he had a triple bypass in 2014 and valve repair in 2015. States he had 5 heart attacks last one being 2014. States he also had a stroke in 2013. States he is on htn medication and blood thinners. States he is on lovenox until he can go back on his coumadin. States they just did a aneurysm on iliac artery about a month and a half ago.

## 2018-11-10 NOTE — CONSULTS
Hospitalist Consult Note     Admit Date:  2018  9:07 PM   Name:  Zaina Capps   Age:  62 y.o.  :  1960   MRN:  [id number]   PCP:  Noemy Leyva MD  Treatment Team: Attending Provider: Aftab Castro MD    HPI:   Mr. Perry Barrios is a 63 y/o WM with a history of HTN, CHF, CAD s/p CABG and with numerous stents, seizure d/o admitted to the cardiology service on  with recurrent, intermittent chest pain. Trops and EKG unremarkable. Stress test is pending. He just moved here from [place] about 2 weeks ago to be closer to his children and grand-children. About 4 weeks ago he underwent a left lower molar tooth extraction during which the tooth was not fully removed and actually was broken. Apparently he then developed osteomyelitis of the jaw. He says he was seen by ID at [facility] and PICC line was placed and he was scheduled to be on 1.5g Unasyn q6 hours for 6 weeks. He does not have any medical records with him. After he moved he says the remaining two weeks of his antibiotics were mailed to him and he has been compliant. He does not have any PCP or other outpatient physicians established since moving. Apparently the patient has a penicillin allergy but takes Benadryl prior to each dose. ROS otherwise negative. 10 systems reviewed and negative except as noted in HPI.   Past Medical History:   Diagnosis Date    CAD (coronary artery disease)     AMI x 4, heart block, afib, mitral insufficiency    Gastrointestinal disorder     Heart failure (HCC)     Hypertension     Ill-defined condition     Psychiatric disorder     PTSD    Seizures ([facility] [address]     Sleep disorder     Insomnia    Stroke ([facility] [address]     Right-sided deficits    Thromboembolus ([facility] [address]       Past Surgical History:   Procedure Laterality Date    ABDOMEN SURGERY PROC UNLISTED      surgery for [facility]    CARDIAC SURG PROCEDURE UNLIST      Abdominal aortic repair, stents x 5, triple bypass, aortic valve repair      Allergies   Allergen Reactions    Aspirin Anaphylaxis    Contrast Agent [Iodine] Hives    Penicillins Hives      Social History     Tobacco Use    Smoking status: Light Tobacco Smoker    Smokeless tobacco: Never Used   Substance Use Topics    Alcohol use: Not on file      No family history on file. There is no immunization history on file for this patient. PTA Medications:  Prior to Admission Medications   Prescriptions Last Dose Informant Patient Reported? Taking?   ampicillin-sulbactam 1.5 gram solr 2018 at Unknown time Self Yes Yes   Si.5 g by IntraVENous route every six (6) hours. Through PICC line   atorvastatin (LIPITOR) 20 mg tablet 2018 at Unknown time  Yes Yes   Sig: Take 20 mg by mouth daily. clopidogrel (PLAVIX) 75 mg tab 2018 at Unknown time  Yes Yes   Sig: Take 75 mg by mouth.   enoxaparin (LOVENOX) 80 mg/0.8 mL injection 2018 at Unknown time  Yes Yes   Si mg by SubCUTAneous route. lamoTRIgine (LAMICTAL) 100 mg tablet 2018 at Unknown time  Yes Yes   Sig: Take 100 mg by mouth two (2) times a day. levETIRAcetam (KEPPRA) 1,000 mg tablet 2018 at Unknown time  Yes Yes   Sig: Take 1,000 mg by mouth two (2) times a day. losartan (COZAAR) 100 mg tablet 2018 at Unknown time  Yes Yes   Sig: Take 100 mg by mouth daily. metoprolol succinate (TOPROL XL) 25 mg XL tablet 2018 at Unknown time  Yes Yes   Sig: Take 25 mg by mouth daily. sertraline (ZOLOFT) 100 mg tablet 2018 at Unknown time  Yes Yes   Sig: Take 100 mg by mouth daily. temazepam (RESTORIL) 30 mg capsule 2018 at Unknown time  Yes Yes   Sig: Take  by mouth nightly as needed for Sleep.   warfarin (COUMADIN) 10 mg tablet 10/10/2018 at Unknown time  Yes Yes   Sig: Take 10 mg by mouth daily.       Facility-Administered Medications: None       Objective:     Patient Vitals for the past 24 hrs:   Temp Pulse Resp BP SpO2   11/10/18 0844 97.8 °F (36.6 °C) 75 18 90/51  11/10/18 0522 97.5 °F (36.4 °C) 63 18 108/56 97 %   11/10/18 0151 98.1 °F (36.7 °C) 83 20 118/72 99 %   11/09/18 2354  64 25 118/78 96 %   11/09/18 2120  79   95 %   11/09/18 1958 98 °F (36.7 °C) 100 18 118/67 99 %     Oxygen Therapy  O2 Sat (%): 97 % (11/10/18 0522)  Pulse via Oximetry: 64 beats per minute (11/09/18 2354)  O2 Device: Room air (11/10/18 0151)    Intake/Output Summary (Last 24 hours) at 11/10/2018 1211  Last data filed at 11/10/2018 0151  Gross per 24 hour   Intake 360 ml   Output    Net 360 ml       *Note that automatically entered I/Os may not be accurate; dependent on patient compliance with collection and accurate  by assistants. Physical Exam:  General:    Well nourished. Alert. Eyes:   Normal sclera. Extraocular movements intact. ENT:  Normocephalic, atraumatic. Moist mucous membranes  Neck:  Supple; no JVD, no masses. Oral:  Broken left molar; no purlence, drainage, erythema or swelling of tooth or gums. CV:   RRR.  2/6 systolic murmur at left sternal border. Lungs:  CTAB. No wheezing, rhonchi, or rales. Abdomen: Soft, nontender, nondistended. Bowel sounds normal.   Extremities: Warm and dry. No cyanosis or edema. Neurologic: CN II-XII grossly intact. Sensation intact. Skin:     No rashes or jaundice. Well healed sternotomy scar. Psych:  Normal mood and affect. I reviewed the labs, imaging, EKGs, telemetry, and other studies done this admission.   Data Review:   Recent Results (from the past 24 hour(s))   EKG, 12 LEAD, INITIAL    Collection Time: 11/09/18  7:52 PM   Result Value Ref Range    Ventricular Rate 97 BPM    Atrial Rate 97 BPM    P-R Interval 144 ms    QRS Duration 80 ms    Q-T Interval 346 ms    QTC Calculation (Bezet) 439 ms    Calculated P Axis 32 degrees    Calculated R Axis 24 degrees    Calculated T Axis 25 degrees    Diagnosis       Normal sinus rhythm  Possible Left atrial enlargement  Left ventricular hypertrophy  Abnormal ECG  No previous ECGs available  Confirmed by [facility]  MD ()EWS ([id number]) on 11/10/2018 10:44:20 AM     POC TROPONIN-I    Collection Time: 11/09/18  8:06 PM   Result Value Ref Range    Troponin-I (POC) 0.01 (L) 0.02 - 0.05 ng/ml   CBC WITH AUTOMATED DIFF    Collection Time: 11/09/18  8:07 PM   Result Value Ref Range    WBC 3.4 (L) 4.3 - 11.1 K/uL    RBC 4.18 (L) 4.23 - 5.6 M/uL    HGB 10.3 (L) 13.6 - 17.2 g/dL    HCT 34.3 (L) 41.1 - 50.3 %    MCV 82.1 79.6 - 97.8 FL    MCH 24.6 (L) 26.1 - 32.9 PG    MCHC 30.0 (L) 31.4 - 35.0 g/dL    RDW 19.3 %    PLATELET 119 423 - 902 K/uL    MPV 10.6 9.4 - 12.3 FL    ABSOLUTE NRBC 0.02 0.0 - 0.2 K/uL    NEUTROPHILS 54 43 - 78 %    LYMPHOCYTES 26 13 - 44 %    MONOCYTES 14 (H) 4.0 - 12.0 %    EOSINOPHILS 3 0.5 - 7.8 %    BASOPHILS 1 0.0 - 2.0 %    IMMATURE GRANULOCYTES 2 0.0 - 5.0 %    ABS. NEUTROPHILS 1.9 1.7 - 8.2 K/UL    ABS. LYMPHOCYTES 0.9 0.5 - 4.6 K/UL    ABS. MONOCYTES 0.5 0.1 - 1.3 K/UL    ABS. EOSINOPHILS 0.1 0.0 - 0.8 K/UL    ABS. BASOPHILS 0.0 0.0 - 0.2 K/UL    ABS. IMM. GRANS. 0.1 0.0 - 0.5 K/UL    DF AUTOMATED     METABOLIC PANEL, COMPREHENSIVE    Collection Time: 11/09/18  8:07 PM   Result Value Ref Range    Sodium 140 136 - 145 mmol/L    Potassium 4.2 3.5 - 5.1 mmol/L    Chloride 109 (H) 98 - 107 mmol/L    CO2 22 21 - 32 mmol/L    Anion gap 9 7 - 16 mmol/L    Glucose 82 65 - 100 mg/dL    BUN 15 6 - 23 MG/DL    Creatinine 1.16 0.8 - 1.5 MG/DL    GFR est AA >60 >60 ml/min/1.73m2    GFR est non-AA >60 >60 ml/min/1.73m2    Calcium 8.5 8.3 - 10.4 MG/DL    Bilirubin, total 0.2 0.2 - 1.1 MG/DL    ALT (SGPT) 27 12 - 65 U/L    AST (SGOT) 15 15 - 37 U/L    Alk.  phosphatase 123 50 - 136 U/L    Protein, total 7.3 6.3 - 8.2 g/dL    Albumin 3.8 3.5 - 5.0 g/dL    Globulin 3.5 2.3 - 3.5 g/dL    A-G Ratio 1.1 (L) 1.2 - 3.5     PROTHROMBIN TIME + INR    Collection Time: 11/10/18  1:20 AM   Result Value Ref Range    Prothrombin time 13.1 11.5 - 14.5 sec    INR 1.0     D DIMER Collection Time: 11/10/18  1:20 AM   Result Value Ref Range    D DIMER 1.70 (HH) <0.56 ug/ml(FEU)   DRUG SCREEN, URINE    Collection Time: 11/10/18  1:45 AM   Result Value Ref Range    PCP(PHENCYCLIDINE) NEGATIVE       BENZODIAZEPINES NEGATIVE       COCAINE NEGATIVE       AMPHETAMINES NEGATIVE       METHADONE NEGATIVE       THC (TH-CANNABINOL) NEGATIVE       OPIATES POSITIVE      BARBITURATES NEGATIVE      EKG, 12 LEAD, SUBSEQUENT    Collection Time: 11/10/18  2:45 AM   Result Value Ref Range    Ventricular Rate 68 BPM    Atrial Rate 68 BPM    P-R Interval 146 ms    QRS Duration 86 ms    Q-T Interval 416 ms    QTC Calculation (Bezet) 442 ms    Calculated P Axis 18 degrees    Calculated R Axis 24 degrees    Calculated T Axis 4 degrees    Diagnosis       Normal sinus rhythm  RSR' or QR pattern in V1 suggests right ventricular conduction delay  Minimal voltage criteria for LVH, may be normal variant  Inferior infarct , age undetermined  Abnormal ECG  When compared with ECG of 09-NOV-2018 19:52,  Inferior infarct is now Present  Confirmed by Edson Vargas MD (), WES LIPSCOMB ([id number]) on 11/10/2018 10:48:10 AM     TROPONIN I    Collection Time: 11/10/18  3:03 AM   Result Value Ref Range    Troponin-I, Qt. <0.02 (L) 0.02 - 0.05 NG/ML   LIPID PANEL    Collection Time: 11/10/18  3:03 AM   Result Value Ref Range    LIPID PROFILE          Cholesterol, total 127 <200 MG/DL    Triglyceride 263 (H) 35 - 150 MG/DL    HDL Cholesterol 41 40 - 60 MG/DL    LDL, calculated 33.4 <100 MG/DL    VLDL, calculated 52.6 (H) 6.0 - 23.0 MG/DL    CHOL/HDL Ratio 3.1         All Micro Results     None          Current Facility-Administered Medications   Medication Dose Route Frequency    clopidogrel (PLAVIX) tablet 75 mg  75 mg Oral DAILY    lamoTRIgine (LaMICtal) tablet 100 mg  100 mg Oral BID    levETIRAcetam (KEPPRA) tablet 1,000 mg  1,000 mg Oral BID    losartan (COZAAR) tablet 100 mg  100 mg Oral DAILY    metoprolol succinate (TOPROL-XL) XL tablet 25 mg  25 mg Oral DAILY    sertraline (ZOLOFT) tablet 100 mg  100 mg Oral DAILY    temazepam (RESTORIL) capsule 30 mg  30 mg Oral QHS PRN    sodium chloride (NS) flush 5-10 mL  5-10 mL IntraVENous Q8H    sodium chloride (NS) flush 5-10 mL  5-10 mL IntraVENous PRN    nitroglycerin (NITROBID) 2 % ointment 1 Inch  1 Inch Topical Q6H    nitroglycerin (NITROSTAT) tablet 0.4 mg  0.4 mg SubLINGual Q5MIN PRN    morphine injection 2 mg  2 mg IntraVENous Q4H PRN    acetaminophen (TYLENOL) tablet 650 mg  650 mg Oral Q4H PRN    naloxone (NARCAN) injection 0.4 mg  0.4 mg IntraVENous PRN    ondansetron (ZOFRAN) injection 4 mg  4 mg IntraVENous Q4H PRN    ampicillin-sulbactam (UNASYN) 1.5 g in 0.9% sodium chloride (MBP/ADV) 50 mL  1.5 g IntraVENous Q6H    diphenhydrAMINE (BENADRYL) injection 50 mg  50 mg IntraVENous Q6H    pantoprazole (PROTONIX) 40 mg in sodium chloride 0.9% 10 mL injection  40 mg IntraVENous ONCE    heparin (porcine) injection 4,000 Units  4,000 Units IntraVENous ONCE    [START ON 11/11/2018] atorvastatin (LIPITOR) tablet 20 mg  20 mg Oral DAILY       Other Studies:  Xr Chest Pa Lat    Result Date: 11/9/2018  TWO-VIEW CHEST: CLINICAL HISTORY: CHEST pain beginning at 1530 hours today. COMPARISON:  None. FINDINGS: PA and lateral chest images demonstrate no acute pneumonic infiltrate or significant pleural fluid collection. Left brachial PICC line is in place with the tip near the cavoatrial junction. The heart size is within normal limits status post CABG without evidence of congestive heart failure or pneumothorax. The bony thorax appears intact on these views. Tip of an IVC filter is seen at the L2 level on the lateral image. IMPRESSION:  STATUS POST CABG WITH LEFT BRACHIAL PICC LINE IN PLACE BUT NO ACUTE CARDIOPULMONARY DISEASE IDENTIFIED.        Assessment and Plan:     Hospital Problems as of 11/10/2018 Never Reviewed          Codes Class Noted - Resolved POA    Chest pain ICD-10-CM: R07.9  ICD-9-CM: 786.50  11/10/2018 - Present Yes        Chronic systolic CHF (congestive heart failure) (HCC) (Chronic) ICD-10-CM: I50.22  ICD-9-CM: 428.22, 428.0  11/10/2018 - Present Yes        HTN (hypertension) (Chronic) ICD-10-CM: I10  ICD-9-CM: 401.9  11/10/2018 - Present Yes        HLD (hyperlipidemia) (Chronic) ICD-10-CM: E78.5  ICD-9-CM: 272.4  11/10/2018 - Present Yes        CAD (coronary artery disease) (Chronic) ICD-10-CM: I25.10  ICD-9-CM: 414.00  11/10/2018 - Present Yes        Seizure ([facility] [address] (Chronic) ICD-10-CM: R56.9  ICD-9-CM: 780.39  11/10/2018 - Present Yes    Overview Signed 11/10/2018 12:16 AM by Raiza Loomis NP     Last was 13yrs ago             TBI (traumatic brain injury) ([facility] [address] (Chronic) ICD-10-CM: S06. 9X9A  ICD-9-CM: 854.00  11/10/2018 - Present Yes        PTSD (post-traumatic stress disorder) (Chronic) ICD-10-CM: F43.10  ICD-9-CM: 309.81  11/10/2018 - Present Yes        Hx pulmonary embolism (Chronic) ICD-10-CM: Z86.711  ICD-9-CM: V12.55  11/10/2018 - Present Yes        History of DVT (deep vein thrombosis) (Chronic) ICD-10-CM: Y18.227  ICD-9-CM: V12.51  11/10/2018 - Present Yes        H/O: CVA (cerebrovascular accident) (Chronic) ICD-10-CM: Z86.73  ICD-9-CM: V12.54  11/10/2018 - Present Yes    Overview Signed 11/10/2018 12:34 AM by Raiza Loomis NP     2013                   PLAN:  # Left mandibular osteomyelitis   - Reportedly diagnosed and treatment scheduled per ID at [facility]. Unfortunately no records available at this time and likely can't get any over the weekend. - Patient does not have any outpatient physicians established since moving locally about 2 weeks ago. He has two weeks of Unasyn remaining and says he has antibiotics at home. Will consult ID in order to establish outpatient follow up and any other pertinent recommendations they may have. Patient is agreeable.     # Seizure d/o   - con't home meds    # Chest pain   - per cards; stress test tomorrow    # CAD s/p CABG and PCI    Signed:  Brendan Lehman MD

## 2018-11-10 NOTE — PROGRESS NOTES
Patient refusing to have more labs drawn from PICC line at this time. He states \"they raimundo them all downstairs\". Explained to patient that the troponin is due every 6 hours and is due now. Patient still refuses. Will draw am labs at 0400 and include troponin.

## 2018-11-10 NOTE — PROGRESS NOTES
Patient complaining of chest pain 7/10. Offered nitro, patient declined nitro due to blood pressure. BP is 118/72 manual in right arm. Patient states he would like the NP notified about pain and new orders.

## 2018-11-10 NOTE — PROGRESS NOTES
Problem: Falls - Risk of 
Goal: *Absence of Falls Document Tessa Myers Fall Risk and appropriate interventions in the flowsheet. Outcome: Progressing Towards Goal 
Fall Risk Interventions: 
Mobility Interventions: Patient to call before getting OOB Medication Interventions: Evaluate medications/consider consulting pharmacy, Patient to call before getting OOB, Teach patient to arise slowly

## 2018-11-10 NOTE — PROGRESS NOTES
NP Nir Bonilla came to talk to patient about pain and treatment options. Patient stated he wants tylenol for a headache. NP ordered 12 lead EKG, heparin gtt, IV protonix. Patient refuses IV protonix and heparin gtt despite the extensive education he received from NP and RN. Gave tylenol PO. Patient agreed for all labs to be drawn from PICC line now. Patient was requesting food as soon as he arrived to the floor. Informed him of the NPO order after midnight for possible heart cath but he stated he had nothing to eat for hours. Allowed patient to eat. Patient ate 2 sandwich trays, 2 whole milk cartons, and a 12 oz pepsi. Patient seems very unresponsive to education.

## 2018-11-10 NOTE — PROGRESS NOTES
Verbal bedside report given to [facility], oncoming RN. Patient's situation, background, assessment and recommendations provided. Opportunity for questions provided. Oncoming RN assumed care of patient.

## 2018-11-11 ENCOUNTER — APPOINTMENT (OUTPATIENT)
Dept: NUCLEAR MEDICINE | Age: 58
End: 2018-11-11
Attending: INTERNAL MEDICINE
Payer: SELF-PAY

## 2018-11-11 ENCOUNTER — APPOINTMENT (OUTPATIENT)
Dept: GENERAL RADIOLOGY | Age: 58
End: 2018-11-11
Attending: INTERNAL MEDICINE
Payer: SELF-PAY

## 2018-11-11 LAB
ANION GAP SERPL CALC-SCNC: 7 MMOL/L (ref 7–16)
BUN SERPL-MCNC: 16 MG/DL (ref 6–23)
CALCIUM SERPL-MCNC: 8.2 MG/DL (ref 8.3–10.4)
CHLORIDE SERPL-SCNC: 111 MMOL/L (ref 98–107)
CO2 SERPL-SCNC: 23 MMOL/L (ref 21–32)
CREAT SERPL-MCNC: 0.95 MG/DL (ref 0.8–1.5)
ERYTHROCYTE [DISTWIDTH] IN BLOOD BY AUTOMATED COUNT: 19.9 %
GLUCOSE SERPL-MCNC: 99 MG/DL (ref 65–100)
HCT VFR BLD AUTO: 34 % (ref 41.1–50.3)
HGB BLD-MCNC: 10.4 G/DL (ref 13.6–17.2)
MAGNESIUM SERPL-MCNC: 2.3 MG/DL (ref 1.8–2.4)
MCH RBC QN AUTO: 25.2 PG (ref 26.1–32.9)
MCHC RBC AUTO-ENTMCNC: 30.6 G/DL (ref 31.4–35)
MCV RBC AUTO: 82.3 FL (ref 79.6–97.8)
NRBC # BLD: 0 K/UL (ref 0–0.2)
PLATELET # BLD AUTO: 187 K/UL (ref 150–450)
PMV BLD AUTO: 10.2 FL (ref 9.4–12.3)
POTASSIUM SERPL-SCNC: 4.1 MMOL/L (ref 3.5–5.1)
RBC # BLD AUTO: 4.13 M/UL (ref 4.23–5.6)
SODIUM SERPL-SCNC: 141 MMOL/L (ref 136–145)
WBC # BLD AUTO: 3.3 K/UL (ref 4.3–11.1)

## 2018-11-11 PROCEDURE — 74011250637 HC RX REV CODE- 250/637: Performed by: NURSE PRACTITIONER

## 2018-11-11 PROCEDURE — 71046 X-RAY EXAM CHEST 2 VIEWS: CPT

## 2018-11-11 PROCEDURE — A9540 TC99M MAA: HCPCS

## 2018-11-11 PROCEDURE — 36592 COLLECT BLOOD FROM PICC: CPT

## 2018-11-11 PROCEDURE — 99218 HC RM OBSERVATION: CPT

## 2018-11-11 PROCEDURE — 85027 COMPLETE CBC AUTOMATED: CPT

## 2018-11-11 PROCEDURE — 74011250636 HC RX REV CODE- 250/636: Performed by: NURSE PRACTITIONER

## 2018-11-11 PROCEDURE — 96366 THER/PROPH/DIAG IV INF ADDON: CPT

## 2018-11-11 PROCEDURE — 74011000258 HC RX REV CODE- 258: Performed by: NURSE PRACTITIONER

## 2018-11-11 PROCEDURE — 74011000258 HC RX REV CODE- 258: Performed by: INTERNAL MEDICINE

## 2018-11-11 PROCEDURE — 74011250636 HC RX REV CODE- 250/636: Performed by: INTERNAL MEDICINE

## 2018-11-11 PROCEDURE — 83735 ASSAY OF MAGNESIUM: CPT

## 2018-11-11 PROCEDURE — 96376 TX/PRO/DX INJ SAME DRUG ADON: CPT

## 2018-11-11 PROCEDURE — 74011250637 HC RX REV CODE- 250/637: Performed by: INTERNAL MEDICINE

## 2018-11-11 PROCEDURE — 80048 BASIC METABOLIC PNL TOTAL CA: CPT

## 2018-11-11 RX ORDER — WARFARIN SODIUM 5 MG/1
5 TABLET ORAL EVERY EVENING
Status: DISCONTINUED | OUTPATIENT
Start: 2018-11-11 | End: 2018-11-12 | Stop reason: HOSPADM

## 2018-11-11 RX ADMIN — SODIUM CHLORIDE 1.5 G: 900 INJECTION, SOLUTION INTRAVENOUS at 15:28

## 2018-11-11 RX ADMIN — DIPHENHYDRAMINE HYDROCHLORIDE 50 MG: 50 INJECTION, SOLUTION INTRAMUSCULAR; INTRAVENOUS at 22:30

## 2018-11-11 RX ADMIN — Medication 10 ML: at 15:42

## 2018-11-11 RX ADMIN — DIPHENHYDRAMINE HYDROCHLORIDE 50 MG: 50 INJECTION, SOLUTION INTRAMUSCULAR; INTRAVENOUS at 15:29

## 2018-11-11 RX ADMIN — SODIUM CHLORIDE 1.5 G: 900 INJECTION, SOLUTION INTRAVENOUS at 06:06

## 2018-11-11 RX ADMIN — LEVETIRACETAM 1000 MG: 500 TABLET ORAL at 22:31

## 2018-11-11 RX ADMIN — LAMOTRIGINE 100 MG: 100 TABLET ORAL at 22:31

## 2018-11-11 RX ADMIN — DIPHENHYDRAMINE HYDROCHLORIDE 50 MG: 50 INJECTION, SOLUTION INTRAMUSCULAR; INTRAVENOUS at 06:03

## 2018-11-11 RX ADMIN — LEVETIRACETAM 1000 MG: 500 TABLET ORAL at 15:34

## 2018-11-11 RX ADMIN — CLOPIDOGREL BISULFATE 75 MG: 75 TABLET ORAL at 15:34

## 2018-11-11 RX ADMIN — LAMOTRIGINE 100 MG: 100 TABLET ORAL at 15:34

## 2018-11-11 RX ADMIN — Medication 10 ML: at 22:37

## 2018-11-11 RX ADMIN — Medication 10 ML: at 06:01

## 2018-11-11 RX ADMIN — SERTRALINE HYDROCHLORIDE 100 MG: 100 TABLET ORAL at 15:34

## 2018-11-11 RX ADMIN — SODIUM CHLORIDE 1.5 G: 900 INJECTION, SOLUTION INTRAVENOUS at 22:30

## 2018-11-11 NOTE — PROGRESS NOTES
Hospitalist Progress Note 2018 Admit Date: 2018  9:07 PM  
NAME: Tanja Amor :  1960 MRN:  [id number] Attending: Yris Garza MD 
PCP:  John Abarca MD 
 
SUBJECTIVE:  
63 y/o WM with a history of HTN, CHF, CAD s/p CABG and with numerous stents, seizure d/o admitted to the cardiology service on  with recurrent, intermittent chest pain. Trops and EKG unremarkable. Pt refusing stress test until PE r/o - V/Q pending. He just moved here from [place] about 2 weeks ago being treated for osteomyelitis of the jaw. On 1.5g Unasyn q6 hours for 6 weeks with [facility]. Pt reports SOB, CP on/off. Poor appetite. Pt refusing meds/certain nursing cares. Refused PICC line care. Review of Systems negative with exception of pertinent positives noted above PHYSICAL EXAM  
 
Visit Vitals /62 (BP 1 Location: Left leg, BP Patient Position: At rest) Pulse 72 Temp 98 °F (36.7 °C) Resp 16 Ht 5' 8\" (1.727 m) Wt 83.2 kg (183 lb 6.4 oz) SpO2 98% BMI 27.89 kg/m² Temp (24hrs), Av °F (36.7 °C), Min:97.8 °F (36.6 °C), Max:98.1 °F (36.7 °C) Oxygen Therapy O2 Sat (%): 98 % (18 0918) Pulse via Oximetry: 64 beats per minute (18 2354) O2 Device: Room air (18 9994) Intake/Output Summary (Last 24 hours) at 2018 1100 Last data filed at 11/10/2018 2223 Gross per 24 hour Intake 740 ml Output 0 ml Net 740 ml General: No acute distress Lungs:  CTA Bilaterally Heart:  Regular rate and rhythm,  + murmur Abdomen: Soft, Non distended, Non tender, Positive bowel sounds Extremities: No cyanosis, clubbing or edema Neurologic:  No focal deficits Psych:  Calm, cooperative ASSESSMENT Active Hospital Problems Diagnosis Date Noted  Chest pain 11/10/2018  Chronic systolic CHF (congestive heart failure) ([facility] [address] 11/10/2018  
 HTN (hypertension) 11/10/2018  HLD (hyperlipidemia) 11/10/2018  CAD (coronary artery disease) 11/10/2018  Seizure ([facility] [address] 11/10/2018 Last was 13yrs ago  TBI (traumatic brain injury) ([facility] [address] 11/10/2018  PTSD (post-traumatic stress disorder) 11/10/2018  Hx pulmonary embolism 11/10/2018  History of DVT (deep vein thrombosis) 11/10/2018  H/O: CVA (cerebrovascular accident) 11/10/2018 2013 Left mandibular osteomyelitis 
- has completed 4 weeks of 6 week course of IV Unasyn, started in [place] 
- recommend continue IV Unasyn with plans for outpt f/u with ID at [place] next week 
- refusing PICC line care Seizure d/o 
- con't home meds 
  
Chest pain - per cards, V/Q planned for today 
  
CAD s/p CABG and PCI We will sign off. Please call back with any questions/concerns. Signed By: Melva Rivera MD   
 November 11, 2018

## 2018-11-11 NOTE — PROGRESS NOTES
Patient refuses antibiotics/benadryl that is due at this time. He states he prefers to be on his 6/12 schedule. Pharmacy will retime accordingly.

## 2018-11-11 NOTE — PROGRESS NOTES
TRANSFER - IN REPORT: 
 
Verbal report received from Ramon Moeller RN on Taniya Meyer being received from ED for routine progression of care Report consisted of patients Situation, Background, Assessment and Recommendations(SBAR). Information from the following report(s) SBAR, Kardex, ED Summary, Intake/Output, MAR, Recent Results and Cardiac Rhythm NSR was reviewed with the receiving nurse. Opportunity for questions and clarification was provided. Assessment completed upon patients arrival to unit and care assumed. Patient not agreeable to answering admission questions at this time. Dual skin assessment complete. Patient has scars to mid abdomen and mid chest. Scattered scratches on legs and arms. Sacrum intact with no redness or breakdown.

## 2018-11-11 NOTE — PROGRESS NOTES
Bedside and Verbal shift change report received from [address]. Report included the following information SBAR, Kardex, Intake/Output, MAR, Recent Results and Cardiac Rhythm NSR.

## 2018-11-11 NOTE — PROGRESS NOTES
Patient quarrelsome about going down to Nuclear Medicine via stretcher. Wants to go via wheelchair or not at all. Discussed hospital policy of transporting patients via stretcher for testing. Given the option of going via stretcher or refusing test. Down to nuc med via stretcher.

## 2018-11-11 NOTE — PROGRESS NOTES
11/11/2018 9:57 AM 
 
Admit Date: 11/9/2018 Admit Diagnosis: Chest pain Subjective: No cp or sob- feels better Objective:  
  
Visit Vitals /62 (BP 1 Location: Left leg, BP Patient Position: At rest) Pulse 72 Temp 98 °F (36.7 °C) Resp 16 Ht 5' 8\" (1.727 m) Wt 83.2 kg (183 lb 6.4 oz) SpO2 98% BMI 27.89 kg/m² Physical Exam: 
[place] Ray, Well Nourished, No Acute Distress, Alert & Oriented x 3, appropriate mood. Neck- supple, no JVD 
CV- regular rate and rhythm no MRG Lung- clear bilaterally Abd- soft, nontender, nondistended Ext- no edema bilaterally. Skin- warm and dry Data Review:  
Recent Labs 11/11/18 
0601 11/10/18 
0303 11/10/18 
0120   --   --   
K 4.1  --   --   
BUN 16  --   --   
CREA 0.95  --   --   
WBC 3.3*  --   --   
HGB 10.4*  --   --   
HCT 34.0*  --   --   
  --   --   
INR  --   --  1.0 HDL  --  41  -- Assessment/Plan: Active Problems: 
  Chest pain (11/10/2018) Chronic systolic CHF (congestive heart failure) (HCC)Stable. Continue current medical therapy. (11/10/2018) HTN (hypertension) (11/10/2018)Stable. Continue current medical therapy. HLD (hyperlipidemia) (11/10/2018)Stable. Continue current medical therapy. CAD (coronary artery disease) (11/10/2018)Stable. Pt refusing  Stress test until PE ruled out-  With iv contrast allergy will start with v/q- may be able to op nuc Seizure ([facility] [address] (11/10/2018) Overview: Last was 13yrs ago TBI (traumatic brain injury) ([facility] [address] (11/10/2018) PTSD (post-traumatic stress disorder) (11/10/2018) Hx pulmonary embolism (11/10/2018) History of DVT (deep vein thrombosis) (11/10/2018) H/O: CVA (cerebrovascular accident) (11/10/2018) Overview: 2013

## 2018-11-11 NOTE — PROGRESS NOTES
Refusing to get on stretcher to go down to nuclear medicine for stress test. States he is not doing anything until he talks to MD.

## 2018-11-11 NOTE — PROGRESS NOTES
Patient is refusing duplex doppler scans of legs. He states his d.dimer is always positive and \"I don't need that\". Informed patient that cardiology restarted his home dose of lovenox to give him anticoagulation. He states \"I will think about it\". NP notified of patient's refusing interventions at this time.

## 2018-11-11 NOTE — PROGRESS NOTES
Verbal bedside report given to [facility], oncoming RN. Patient's situation, background, assessment and recommendations provided. Opportunity for questions provided. Oncoming RN assumed care of patient.

## 2018-11-12 VITALS
WEIGHT: 183.4 LBS | TEMPERATURE: 98 F | HEART RATE: 97 BPM | SYSTOLIC BLOOD PRESSURE: 128 MMHG | OXYGEN SATURATION: 99 % | DIASTOLIC BLOOD PRESSURE: 72 MMHG | BODY MASS INDEX: 27.8 KG/M2 | RESPIRATION RATE: 20 BRPM | HEIGHT: 68 IN

## 2018-11-12 LAB
ANION GAP SERPL CALC-SCNC: 8 MMOL/L (ref 7–16)
BUN SERPL-MCNC: 18 MG/DL (ref 6–23)
CALCIUM SERPL-MCNC: 8 MG/DL (ref 8.3–10.4)
CHLORIDE SERPL-SCNC: 114 MMOL/L (ref 98–107)
CO2 SERPL-SCNC: 22 MMOL/L (ref 21–32)
CREAT SERPL-MCNC: 1 MG/DL (ref 0.8–1.5)
ERYTHROCYTE [DISTWIDTH] IN BLOOD BY AUTOMATED COUNT: 19.6 %
FERRITIN SERPL-MCNC: 13 NG/ML (ref 8–388)
GLUCOSE SERPL-MCNC: 92 MG/DL (ref 65–100)
HCT VFR BLD AUTO: 31.8 % (ref 41.1–50.3)
HGB BLD-MCNC: 9.8 G/DL (ref 13.6–17.2)
IRON SATN MFR SERPL: 7 %
IRON SERPL-MCNC: 24 UG/DL (ref 35–150)
MAGNESIUM SERPL-MCNC: 2.3 MG/DL (ref 1.8–2.4)
MCH RBC QN AUTO: 25.1 PG (ref 26.1–32.9)
MCHC RBC AUTO-ENTMCNC: 30.8 G/DL (ref 31.4–35)
MCV RBC AUTO: 81.3 FL (ref 79.6–97.8)
NRBC # BLD: 0 K/UL (ref 0–0.2)
PLATELET # BLD AUTO: 189 K/UL (ref 150–450)
PMV BLD AUTO: 10.6 FL (ref 9.4–12.3)
POTASSIUM SERPL-SCNC: 4 MMOL/L (ref 3.5–5.1)
RBC # BLD AUTO: 3.91 M/UL (ref 4.23–5.6)
SODIUM SERPL-SCNC: 144 MMOL/L (ref 136–145)
TIBC SERPL-MCNC: 357 UG/DL (ref 250–450)
WBC # BLD AUTO: 3.7 K/UL (ref 4.3–11.1)

## 2018-11-12 PROCEDURE — 96376 TX/PRO/DX INJ SAME DRUG ADON: CPT

## 2018-11-12 PROCEDURE — 74011250636 HC RX REV CODE- 250/636: Performed by: NURSE PRACTITIONER

## 2018-11-12 PROCEDURE — 36592 COLLECT BLOOD FROM PICC: CPT

## 2018-11-12 PROCEDURE — 36415 COLL VENOUS BLD VENIPUNCTURE: CPT

## 2018-11-12 PROCEDURE — 80048 BASIC METABOLIC PNL TOTAL CA: CPT

## 2018-11-12 PROCEDURE — 99218 HC RM OBSERVATION: CPT

## 2018-11-12 PROCEDURE — 83735 ASSAY OF MAGNESIUM: CPT

## 2018-11-12 PROCEDURE — 96366 THER/PROPH/DIAG IV INF ADDON: CPT

## 2018-11-12 PROCEDURE — 74011250636 HC RX REV CODE- 250/636: Performed by: INTERNAL MEDICINE

## 2018-11-12 PROCEDURE — 82728 ASSAY OF FERRITIN: CPT

## 2018-11-12 PROCEDURE — 74011250637 HC RX REV CODE- 250/637: Performed by: NURSE PRACTITIONER

## 2018-11-12 PROCEDURE — 85027 COMPLETE CBC AUTOMATED: CPT

## 2018-11-12 PROCEDURE — 74011000258 HC RX REV CODE- 258: Performed by: INTERNAL MEDICINE

## 2018-11-12 PROCEDURE — 83540 ASSAY OF IRON: CPT

## 2018-11-12 RX ORDER — ASCORBIC ACID 500 MG
500 TABLET ORAL DAILY
Status: DISCONTINUED | OUTPATIENT
Start: 2018-11-12 | End: 2018-11-12 | Stop reason: HOSPADM

## 2018-11-12 RX ORDER — FERROUS GLUCONATE 324(38)MG
324 TABLET ORAL 2 TIMES DAILY WITH MEALS
Qty: 30 TAB | Refills: 11 | Status: SHIPPED | OUTPATIENT
Start: 2018-11-12

## 2018-11-12 RX ORDER — FERROUS GLUCONATE 324(38)MG
1 TABLET ORAL 2 TIMES DAILY WITH MEALS
Status: DISCONTINUED | OUTPATIENT
Start: 2018-11-12 | End: 2018-11-12 | Stop reason: HOSPADM

## 2018-11-12 RX ORDER — NITROGLYCERIN 0.4 MG/1
0.4 TABLET SUBLINGUAL
Qty: 1 BOTTLE | Refills: 4 | Status: SHIPPED | OUTPATIENT
Start: 2018-11-12

## 2018-11-12 RX ADMIN — LAMOTRIGINE 100 MG: 100 TABLET ORAL at 08:46

## 2018-11-12 RX ADMIN — Medication 10 ML: at 06:00

## 2018-11-12 RX ADMIN — LEVETIRACETAM 1000 MG: 500 TABLET ORAL at 08:46

## 2018-11-12 RX ADMIN — CLOPIDOGREL BISULFATE 75 MG: 75 TABLET ORAL at 08:46

## 2018-11-12 RX ADMIN — DIPHENHYDRAMINE HYDROCHLORIDE 50 MG: 50 INJECTION, SOLUTION INTRAMUSCULAR; INTRAVENOUS at 06:00

## 2018-11-12 RX ADMIN — SODIUM CHLORIDE 1.5 G: 900 INJECTION, SOLUTION INTRAVENOUS at 06:00

## 2018-11-12 NOTE — PROGRESS NOTES
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 Plan:  
· Continue Unasyn while here. He has been sent enough antibiotic to get to end of intended course. He has follow up at [place] in [facility] next week. ID will sign off. Anti-infectives:  
Unasyn 11/10- Subjective: No complaints; up walking halls. HE had all the antibiotics he needs to get to end of 6 week course ordered by ID at [facility] and no issues with PICC. Review of Systems:  A comprehensive review of systems was negative except for that written in the History of Present Illness. Objective:  
 
Visit Vitals /72 (BP 1 Location: Left leg, BP Patient Position: Supine) Pulse 97 Temp 98 °F (36.7 °C) Resp 20 Ht 5' 8\" (1.727 m) Wt 83.2 kg (183 lb 6.4 oz) SpO2 99% BMI 27.89 kg/m² Temp (24hrs), Av.8 °F (36.6 °C), Min:97.4 °F (36.3 °C), Max:98 °F (36.7 °C) Lines:  PICC Physical Exam:  
General:  Alert, cooperative, well noursished, well developed, appears stated age Eyes:  Sclera anicteric. Mouth/Throat:   
Neck: symmetric Lungs:   Breathing comfortably CV:    
Abdomen:    non-distended Skin: Skin color, texture, turgor normal. no acute rash or lesions Lymph nodes: Musculoskeletal: No swelling or deformity Lines/Devices:  PICC Psych: Alert and oriented, normal mood affect given the setting Data Review: CBC:  
Recent Labs 18 
0507 18 
0601 18 WBC 3.7* 3.3* 3.4*  
RBC 3.91* 4.13* 4.18* HGB 9.8* 10.4* 10.3* HCT 31.8* 34.0* 34.3*  
 187 230 GRANS  --   --  54  
LYMPH  --   --  26 EOS  --   --  3 CMP:  
Recent Labs 18 
0507 18 
0601 18 GLU 92 99 82  141 140  
K 4.0 4.1 4.2 * 111* 109* CO2 22 23 22 BUN 18 16 15 CREA 1.00 0.95 1.16  
CA 8.0* 8.2* 8.5 AGAP 8 7 9 AP  --   --  123 TP  --   --  7.3 ALB  --   --  3.8 GLOB  --   --  3.5 AGRAT  --   --  1.1* Liver Enzymes:  
Recent Labs 11/09/18 2007  
TP 7.3 ALB 3.8  SGOT 15 Microbiology:  
 
none Imaging:  
Signed by Signed Date/Time  Phone Pager Marcelo Gr 11/11/2018 12:58 [phone] Exam Information Status Exam Begun  Exam Ended Final [99] 11/11/2018 11:52 11/11/2018 11:52 Study Result TWO-VIEW CHEST: 
  
CLINICAL HISTORY: CHEST pain with elevated d-dimer. 
  
COMPARISON:  VQ scan today and radiographs of November 9, 2018. 
  
FINDINGS: PA and lateral chest images demonstrate no acute pneumonic infiltrate 
or significant pleural fluid collection. The heart is mildly enlarged without 
evidence of congestive heart failure or pneumothorax. The bony thorax appears 
intact on these views. There are overlying radiopaque support devices. 
  
IMPRESSION IMPRESSION:  MILD CARDIOMEGALY WITH NO ACUTE CARDIOPULMONARY DISEASE IDENTIFIED. Signed By: Kenyetta Yanez MD   
 November 12, 2018

## 2018-11-12 NOTE — PROGRESS NOTES
[facility] CARDIOLOGY PROGRESS NOTE 
      
 
11/12/2018 9:18 AM 
 
Admit Date: 11/9/2018 Subjective: No cp or sob ROS: 
Cardiovascular:  As noted above Objective:  
  
Vitals:  
 11/11/18 1256 11/11/18 1500 11/11/18 1651 11/11/18 2237 BP: 148/59 108/80 122/69 128/72 Pulse: 68 66 88 97 Resp: 18 18 22 20 Temp: 97.8 °F (36.6 °C) 97.4 °F (36.3 °C) 97.8 °F (36.6 °C) 98 °F (36.7 °C) SpO2: 97% 97% 98% 99% Weight:      
Height:      
 
 
Physical Exam: 
General-No Acute Distress Neck- supple, no JVD 
CV- regular rate and rhythm no MRG Lung- clear bilaterally Abd- soft, nontender, nondistended Ext- no edema bilaterally. Skin- warm and dry Data Review:  
Recent Labs 11/12/18 
0507 11/11/18 
0601 11/10/18 
0303 11/10/18 
0120  141  --   --   
K 4.0 4.1  --   --   
MG 2.3 2.3  --   --   
BUN 18 16  --   --   
CREA 1.00 0.95  --   --   
GLU 92 99  --   --   
WBC 3.7* 3.3*  --   --   
HGB 9.8* 10.4*  --   --   
HCT 31.8* 34.0*  --   --   
 187  --   --   
INR  --   --   --  1.0  
TROIQ  --   --  <0.02*  --   
CHOL  --   --  127  --   
LDLC  --   --  33.4  --   
HDL  --   --  41  -- Assessment/Plan: Active Problems: 
  Chest pain (11/10/2018) Chronic systolic CHF (congestive heart failure) ([facility] (11/10/2018) HTN (hypertension) (11/10/2018) HLD (hyperlipidemia) (11/10/2018) CAD (coronary artery disease) (11/10/2018) Seizure ([facility] (11/10/2018) TBI (traumatic brain injury) ([facility] (11/10/2018) PTSD (post-traumatic stress disorder) (11/10/2018) Hx pulmonary embolism (11/10/2018) History of DVT (deep vein thrombosis) (11/10/2018) H/O: CVA (cerebrovascular accident) (11/10/2018) 
   
/// He looks good this AM. Plan to discharge. Labs c/w iron deficiency. FeSo4, Vit C and [facility] to fu w/ [facility] given.  
 
 
 
 
Rodrigo Ca MD 
11/12/2018 9:18 AM

## 2018-11-12 NOTE — PROGRESS NOTES
Discharge instructions reviewed with patient. Prescriptions given for nitroglycerin and ferrous gluconate. Opportunity for questions provided. Patient voiced understanding of all discharge instructions. IV(s) and heart monitor removed by primary RN.

## 2018-11-12 NOTE — PROGRESS NOTES
Called the [place] in [place] @ [phone] and spoke with the . The [facility] is closed today for [facility] day. Pt will be instructed to call in the AM to make arrangements for a appointment.

## 2018-11-12 NOTE — PROGRESS NOTES
During shift assessment pt refused lovenox. States \"im not taking it until I speak to a doctor in the morning. \"

## 2018-11-12 NOTE — PROGRESS NOTES
Care Management Interventions PCP Verified by CM: Yes([facility] ) Palliative Care Criteria Met (RRAT>21 & CHF Dx)?: No(RRAT 2 Dx Chest pain ) Mode of Transport at Discharge: Other (see comment)(taxi cab ) Transition of Care Consult (CM Consult): Other(Nurse provided from [facility] that does PICC line care) Discharge Durable Medical Equipment: No 
Physical Therapy Consult: No 
Occupational Therapy Consult: No 
Speech Therapy Consult: No 
Current Support Network: Lives Alone Confirm Follow Up Transport: Friends Plan discussed with Pt/Family/Caregiver: Yes Freedom of Choice Offered: Yes Discharge Location Discharge Placement: Home Met with patient for d/c planning. Patient alert and oriented x 3, independent of ADL's and lives alone. Je requires no DME at home. Patient is on Unasyn IV at home has 2 more weeks to complete. Patient states he has all needed IV antibiotics at home and does not have home health but that a nurse from the [place] comes and takes care of his PICC line once a week. He states he does not need any more medication IV as he has all needed remaining IV meds at home. He states that he has spoken with the RN that takes care of him and they are aware of d/c. The [facility] is closed today for [facility] day and he is to follow up in am with phone call and follow up. Patient voices no needs or concerns. Patient d/c home with [employer] taxi.

## 2018-11-12 NOTE — DISCHARGE SUMMARY
[facility] Cardiology Discharge Summary     Patient ID:  Marcos Lundy  [id number]  62 y.o.  1960    Admit date: 11/9/2018    Discharge date and time:  11/12/18    Admitting Physician: Paige Welch MD     Discharge Physician: Jayna Miller NP/Dr. Orly Montenegro    Admission Diagnoses: Chest pain    Discharge Diagnoses:   Patient Active Problem List    Diagnosis Date Noted    Chest pain 11/10/2018    Chronic systolic CHF (congestive heart failure) ([facility] 11/10/2018    HTN (hypertension) 11/10/2018    HLD (hyperlipidemia) 11/10/2018    CAD (coronary artery disease) 11/10/2018    Seizure ([facility] 11/10/2018    TBI (traumatic brain injury) ([facility] 11/10/2018    PTSD (post-traumatic stress disorder) 11/10/2018    Hx pulmonary embolism 11/10/2018    History of DVT (deep vein thrombosis) 11/10/2018    H/O: CVA (cerebrovascular accident) 11/10/2018       Cardiology Procedures this admission: VQ Scan, Chest X Ray    Consults: Internal Medicine, ID    Hospital Course:  Marcos Lundy is a 62 y.o.  male who was previously followed at the [facility] and recently relocated to [place] (~2 weeks ago) and is trying to get established with [place] [id number]. He developed sharp onset of L sided CP and took SL nitro with some relief but the pain returned. He stated he was taking all medications as prescribed and still has a PICC line in place for 2 weeks left of IVABX for treatment of Jaw osteomyelitis with the patient having all his ABX he needs given by [facility]. The patient would not allow staff to examin his PICC line or dressing while he was at [facility]. The patient had serial negative trop I and negative EKG changes found. The patient refused to have NST or CAD workup until PE was ruled out for elevated D Dimer that he states \"is chronically elevated\". The patient refused Doppler US of his legs for a PE workup.   With an IV contrast dye allergy the patient had a VQ scan on 11/11/2018 that showed low likely louann for PE. During this admission the patient had multiple incidence of refusing care or participating in his care including vital signs, weights, dressing examination, testing, refusing medications, refusing to be transport to testing, refusing medications at scheduled times, and has been verbally aggressive toward staff. The patient also stated \"i am not taking that until I speak to the doctor in the morning. I will have to stay until my INR is therapeutic, so I don't think I'm going to take it referring to his coumadin. \" per nursing notes. Imaging this Admission    Nm Lung Vent/perf    Result Date: 11/11/2018  IMPRESSION:  Low likelihood ratio for acute pulmonary embolism. Xr Chest Pa Lat    Result Date: 11/11/2018  IMPRESSION:  MILD CARDIOMEGALY WITH NO ACUTE CARDIOPULMONARY DISEASE IDENTIFIED. Xr Chest Pa Lat    Result Date: 11/9/2018  IMPRESSION:  STATUS POST CABG WITH LEFT BRACHIAL PICC LINE IN PLACE BUT NO ACUTE CARDIOPULMONARY DISEASE IDENTIFIED. The patient is not cooperative in his care and is refusing ongoing treatment. The day of discharge Pt's labs were labs associated with chronic iron deficiency anemia with no complaints. The patient was instructed to continue to see [facility] with [facility] for [facility]. Pt was seen and examined by Dr. Damian Pool and determined stable and ready for discharge. Pt was instructed to follow discharge instructions given by nursing staff. DISPOSITION: The patient is being discharged home in stable condition on a low saturated fat, low cholesterol and low salt diet. Pt is instructed to advance activities as tolerated limited to fatigue or shortness of breath.         Discharge Exam:   Visit Vitals  /72 (BP 1 Location: Left leg, BP Patient Position: Supine)   Pulse 97   Temp 98 °F (36.7 °C)   Resp 20   Ht 5' 8\" (1.727 m)   Wt 83.2 kg (183 lb 6.4 oz)   SpO2 99%   BMI 27.89 kg/m²    Pt has been seen by Damian Pool: see his progress note for exam details. Recent Results (from the past 24 hour(s))   METABOLIC PANEL, BASIC    Collection Time: 11/12/18  5:07 AM   Result Value Ref Range    Sodium 144 136 - 145 mmol/L    Potassium 4.0 3.5 - 5.1 mmol/L    Chloride 114 (H) 98 - 107 mmol/L    CO2 22 21 - 32 mmol/L    Anion gap 8 7 - 16 mmol/L    Glucose 92 65 - 100 mg/dL    BUN 18 6 - 23 MG/DL    Creatinine 1.00 0.8 - 1.5 MG/DL    GFR est AA >60 >60 ml/min/1.73m2    GFR est non-AA >60 >60 ml/min/1.73m2    Calcium 8.0 (L) 8.3 - 10.4 MG/DL   CBC W/O DIFF    Collection Time: 11/12/18  5:07 AM   Result Value Ref Range    WBC 3.7 (L) 4.3 - 11.1 K/uL    RBC 3.91 (L) 4.23 - 5.6 M/uL    HGB 9.8 (L) 13.6 - 17.2 g/dL    HCT 31.8 (L) 41.1 - 50.3 %    MCV 81.3 79.6 - 97.8 FL    MCH 25.1 (L) 26.1 - 32.9 PG    MCHC 30.8 (L) 31.4 - 35.0 g/dL    RDW 19.6 %    PLATELET 991 577 - 256 K/uL    MPV 10.6 9.4 - 12.3 FL    ABSOLUTE NRBC 0.00 0.0 - 0.2 K/uL   MAGNESIUM    Collection Time: 11/12/18  5:07 AM   Result Value Ref Range    Magnesium 2.3 1.8 - 2.4 mg/dL   TRANSFERRIN SATURATION    Collection Time: 11/12/18  5:30 AM   Result Value Ref Range    Iron 24 (L) 35 - 150 ug/dL    TIBC 357 250 - 450 ug/dL    Transferrin Saturation 7 (L) >20 %   FERRITIN    Collection Time: 11/12/18  5:30 AM   Result Value Ref Range    Ferritin 13 8 - 388 NG/ML         Patient Instructions:   Current Discharge Medication List      START taking these medications    Details   ferrous gluconate 324 mg (38 mg iron) tablet Take 1 Tab by mouth two (2) times daily (with meals). Qty: 30 Tab, Refills: 11      nitroglycerin (NITROSTAT) 0.4 mg SL tablet 1 Tab by SubLINGual route every five (5) minutes as needed for Chest Pain. Up to 3 doses. Qty: 1 Bottle, Refills: 4         CONTINUE these medications which have NOT CHANGED    Details   ampicillin-sulbactam 1.5 gram solr 1.5 g by IntraVENous route every six (6) hours.  Through PICC line      levETIRAcetam (KEPPRA) 1,000 mg tablet Take 1,000 mg by mouth two (2) times a day. lamoTRIgine (LAMICTAL) 100 mg tablet Take 100 mg by mouth two (2) times a day. atorvastatin (LIPITOR) 20 mg tablet Take 20 mg by mouth daily. losartan (COZAAR) 100 mg tablet Take 100 mg by mouth daily. enoxaparin (LOVENOX) 80 mg/0.8 mL injection 80 mg by SubCUTAneous route. clopidogrel (PLAVIX) 75 mg tab Take 75 mg by mouth. sertraline (ZOLOFT) 100 mg tablet Take 100 mg by mouth daily. temazepam (RESTORIL) 30 mg capsule Take  by mouth nightly as needed for Sleep.      metoprolol succinate (TOPROL XL) 25 mg XL tablet Take 25 mg by mouth daily. warfarin (COUMADIN) 10 mg tablet Take 10 mg by mouth daily.                Signed:  Rodolfo Montero NP  11/12/2018  9:39 AM

## 2018-11-12 NOTE — DISCHARGE INSTRUCTIONS
Chest Pain: Care Instructions  Your Care Instructions    There are many things that can cause chest pain. Some are not serious and will get better on their own in a few days. But some kinds of chest pain need more testing and treatment. Your doctor may have recommended a follow-up visit in the next 8 to 12 hours. If you are not getting better, you may need more tests or treatment. Even though your doctor has released you, you still need to watch for any problems. The doctor carefully checked you, but sometimes problems can develop later. If you have new symptoms or if your symptoms do not get better, get medical care right away. If you have worse or different chest pain or pressure that lasts more than 5 minutes or you passed out (lost consciousness), call 911 or seek other emergency help right away. A medical visit is only one step in your treatment. Even if you feel better, you still need to do what your doctor recommends, such as going to all suggested follow-up appointments and taking medicines exactly as directed. This will help you recover and help prevent future problems. How can you care for yourself at home? · Rest until you feel better. · Take your medicine exactly as prescribed. Call your doctor if you think you are having a problem with your medicine. · Do not drive after taking a prescription pain medicine. When should you call for help? Call 911 if:    · You passed out (lost consciousness).     · You have severe difficulty breathing.     · You have symptoms of a heart attack. These may include:  ? Chest pain or pressure, or a strange feeling in your chest.  ? Sweating. ? Shortness of breath. ? Nausea or vomiting. ? Pain, pressure, or a strange feeling in your back, neck, jaw, or upper belly or in one or both shoulders or arms. ? Lightheadedness or sudden weakness. ? A fast or irregular heartbeat.   After you call 911, the  may tell you to chew 1 adult-strength or 2 to 4 low-dose aspirin. Wait for an ambulance. Do not try to drive yourself.    Call your doctor today if:    · You have any trouble breathing.     · Your chest pain gets worse.     · You are dizzy or lightheaded, or you feel like you may faint.     · You are not getting better as expected.     · You are having new or different chest pain. Where can you learn more? Go to http://kiran-atul.info/. Enter A120 in the search box to learn more about \"Chest Pain: Care Instructions. \"  Current as of: November 20, 2017  Content Version: 11.8  © 4708-3237 [employer]. Care instructions adapted under license by [employer] (which disclaims liability or warranty for this information). If you have questions about a medical condition or this instruction, always ask your healthcare professional. [address] any warranty or liability for your use of this information. Iron Deficiency Anemia: Care Instructions  Your Care Instructions    Anemia means that you do not have enough red blood cells. Red blood cells carry oxygen around your body. When you have anemia, it can make you pale, weak, and tired. Many things can cause anemia. The most common cause is loss of blood. This can happen if you have heavy menstrual periods. It can also happen if you have bleeding in your stomach or bowel. You can also get anemia if you don't have enough iron in your diet or if it's hard for your body to absorb iron. In some cases, pregnancy causes anemia. That's because a pregnant woman needs more iron. Your doctor may do more tests to find the cause of your anemia. If a disease or other health problem is causing it, your doctor will treat that problem. It's important to follow up with your doctor to make sure that your iron level returns to normal.  Follow-up care is a key part of your treatment and safety.  Be sure to make and go to all appointments, and call your doctor if you are having problems. It's also a good idea to know your test results and keep a list of the medicines you take. How can you care for yourself at home? · If your doctor recommended iron pills, take them as directed. ? Try to take the pills on an empty stomach. You can do this about 1 hour before or 2 hours after meals. But you may need to take iron with food to avoid an upset stomach. ? Do not take antacids or drink milk or anything with caffeine within 2 hours of when you take your iron. They can keep your body from absorbing the iron well. ? Vitamin C helps your body absorb iron. You may want to take iron pills with a glass of orange juice or some other food high in vitamin C.  ? Iron pills may cause stomach problems. These include heartburn, nausea, diarrhea, constipation, and cramps. It can help to drink plenty of fluids and include fruits, vegetables, and fiber in your diet. ? It's normal for iron pills to make your stool a greenish or grayish black. But internal bleeding can also cause dark stool. So it's important to tell your doctor about any color changes. ? Call your doctor if you think you are having a problem with your iron pills. Even after you start to feel better, it will take several months for your body to build up its supply of iron. ? If you miss a pill, don't take a double dose. ? Keep iron pills out of the reach of small children. Too much iron can be very dangerous. · Eat foods with a lot of iron. These include red meat, shellfish, poultry, and eggs. They also include beans, raisins, whole-grain bread, and leafy green vegetables. · Steam your vegetables. This is the best way to prepare them if you want to get as much iron as possible. · Be safe with medicines. Do not take nonsteroidal anti-inflammatory pain relievers unless your doctor tells you to. These include aspirin, naproxen (Aleve), and ibuprofen (Advil, Motrin). · Liquid iron can stain your teeth.  But you can mix it with water or juice and drink it with a straw. Then it won't get on your teeth. When should you call for help? Call 911 anytime you think you may need emergency care. For example, call if:    · You passed out (lost consciousness).    Call your doctor now or seek immediate medical care if:    · You are short of breath.     · You are dizzy or light-headed, or you feel like you may faint.     · You have new or worse bleeding.    Watch closely for changes in your health, and be sure to contact your doctor if:    · You feel weaker or more tired than usual.     · You do not get better as expected. Where can you learn more? Go to http://kiran-atul.info/. Enter Z017 in the search box to learn more about \"Iron Deficiency Anemia: Care Instructions. \"  Current as of: May 7, 2018  Content Version: 11.8  © 9630-1814 [employer]. Care instructions adapted under license by [employer] (which disclaims liability or warranty for this information). If you have questions about a medical condition or this instruction, always ask your healthcare professional. [address] any warranty or liability for your use of this information.

## 2018-11-12 NOTE — PROGRESS NOTES
Verbal bedside report given to Shay Gross oncoming RN. Patient's situation, background, assessment and recommendations provided. Opportunity for questions provided. Oncoming RN assumed care of patient.

## 2018-11-12 NOTE — PROGRESS NOTES
Bedside and Verbal shift change report given to self (oncoming nurse) by Leatha Lora RN (offgoing nurse). Report included the following information SBAR, Kardex, MAR and Recent Results.